# Patient Record
Sex: MALE | Race: WHITE | NOT HISPANIC OR LATINO | Employment: UNEMPLOYED | URBAN - METROPOLITAN AREA
[De-identification: names, ages, dates, MRNs, and addresses within clinical notes are randomized per-mention and may not be internally consistent; named-entity substitution may affect disease eponyms.]

---

## 2018-05-02 ENCOUNTER — OFFICE VISIT CONVERTED (OUTPATIENT)
Dept: SURGERY | Facility: CLINIC | Age: 48
End: 2018-05-02
Attending: SURGERY

## 2018-08-20 ENCOUNTER — CONVERSION ENCOUNTER (OUTPATIENT)
Dept: FAMILY MEDICINE CLINIC | Facility: CLINIC | Age: 48
End: 2018-08-20

## 2018-08-20 ENCOUNTER — OFFICE VISIT CONVERTED (OUTPATIENT)
Dept: FAMILY MEDICINE CLINIC | Facility: CLINIC | Age: 48
End: 2018-08-20
Attending: NURSE PRACTITIONER

## 2019-03-12 ENCOUNTER — OFFICE VISIT CONVERTED (OUTPATIENT)
Dept: SURGERY | Facility: CLINIC | Age: 49
End: 2019-03-12
Attending: SURGERY

## 2019-03-20 ENCOUNTER — HOSPITAL ENCOUNTER (OUTPATIENT)
Dept: CT IMAGING | Facility: HOSPITAL | Age: 49
Discharge: HOME | End: 2019-03-20
Attending: SURGERY

## 2019-03-21 ENCOUNTER — OFFICE VISIT CONVERTED (OUTPATIENT)
Dept: SURGERY | Facility: CLINIC | Age: 49
End: 2019-03-21
Attending: SURGERY

## 2019-03-21 ENCOUNTER — CONVERSION ENCOUNTER (OUTPATIENT)
Dept: SURGERY | Facility: CLINIC | Age: 49
End: 2019-03-21

## 2019-04-19 ENCOUNTER — HOSPITAL ENCOUNTER (OUTPATIENT)
Dept: PERIOP | Facility: HOSPITAL | Age: 49
Setting detail: HOSPITAL OUTPATIENT SURGERY
Discharge: HOME | End: 2019-04-19
Attending: SURGERY

## 2019-04-22 ENCOUNTER — OFFICE VISIT CONVERTED (OUTPATIENT)
Dept: SURGERY | Facility: CLINIC | Age: 49
End: 2019-04-22
Attending: NURSE PRACTITIONER

## 2019-05-07 ENCOUNTER — OFFICE VISIT CONVERTED (OUTPATIENT)
Dept: SURGERY | Facility: CLINIC | Age: 49
End: 2019-05-07
Attending: SURGERY

## 2020-01-07 ENCOUNTER — OFFICE VISIT CONVERTED (OUTPATIENT)
Dept: FAMILY MEDICINE CLINIC | Facility: CLINIC | Age: 50
End: 2020-01-07
Attending: NURSE PRACTITIONER

## 2020-01-13 ENCOUNTER — HOSPITAL ENCOUNTER (OUTPATIENT)
Dept: GENERAL RADIOLOGY | Facility: HOSPITAL | Age: 50
Discharge: HOME | End: 2020-01-13
Attending: NURSE PRACTITIONER

## 2020-01-28 ENCOUNTER — OFFICE VISIT CONVERTED (OUTPATIENT)
Dept: NEUROSURGERY | Facility: CLINIC | Age: 50
End: 2020-01-28
Attending: PHYSICIAN ASSISTANT

## 2020-02-20 ENCOUNTER — HOSPITAL ENCOUNTER (OUTPATIENT)
Dept: PHYSICAL THERAPY | Facility: CLINIC | Age: 50
Setting detail: RECURRING SERIES
Discharge: HOME | End: 2020-06-24
Attending: NEUROLOGICAL SURGERY

## 2020-02-25 ENCOUNTER — OFFICE VISIT CONVERTED (OUTPATIENT)
Dept: FAMILY MEDICINE CLINIC | Facility: CLINIC | Age: 50
End: 2020-02-25
Attending: NURSE PRACTITIONER

## 2020-03-27 ENCOUNTER — OFFICE VISIT CONVERTED (OUTPATIENT)
Dept: FAMILY MEDICINE CLINIC | Facility: CLINIC | Age: 50
End: 2020-03-27
Attending: NURSE PRACTITIONER

## 2020-04-01 ENCOUNTER — TELEMEDICINE CONVERTED (OUTPATIENT)
Dept: NEUROSURGERY | Facility: CLINIC | Age: 50
End: 2020-04-01
Attending: PHYSICIAN ASSISTANT

## 2020-05-05 ENCOUNTER — TELEPHONE CONVERTED (OUTPATIENT)
Dept: NEUROSURGERY | Facility: CLINIC | Age: 50
End: 2020-05-05
Attending: NEUROLOGICAL SURGERY

## 2020-05-27 ENCOUNTER — HOSPITAL ENCOUNTER (OUTPATIENT)
Dept: LAB | Facility: HOSPITAL | Age: 50
Discharge: HOME | End: 2020-05-27
Attending: NURSE PRACTITIONER

## 2020-05-27 LAB
ALBUMIN SERPL-MCNC: 4.4 G/DL (ref 3.5–5)
ALBUMIN/GLOB SERPL: 1.5 {RATIO} (ref 1.4–2.6)
ALP SERPL-CCNC: 55 U/L (ref 53–128)
ALT SERPL-CCNC: 11 U/L (ref 10–40)
ANION GAP SERPL CALC-SCNC: 14 MMOL/L (ref 8–19)
AST SERPL-CCNC: 15 U/L (ref 15–50)
BASOPHILS # BLD AUTO: 0.03 10*3/UL (ref 0–0.2)
BASOPHILS NFR BLD AUTO: 0.5 % (ref 0–3)
BILIRUB SERPL-MCNC: 0.46 MG/DL (ref 0.2–1.3)
BUN SERPL-MCNC: 15 MG/DL (ref 5–25)
BUN/CREAT SERPL: 13 {RATIO} (ref 6–20)
CALCIUM SERPL-MCNC: 9.1 MG/DL (ref 8.7–10.4)
CHLORIDE SERPL-SCNC: 101 MMOL/L (ref 99–111)
CHOLEST SERPL-MCNC: 167 MG/DL (ref 107–200)
CHOLEST/HDLC SERPL: 4.9 {RATIO} (ref 3–6)
CONV ABS IMM GRAN: 0.02 10*3/UL (ref 0–0.2)
CONV CO2: 26 MMOL/L (ref 22–32)
CONV IMMATURE GRAN: 0.3 % (ref 0–1.8)
CONV TOTAL PROTEIN: 7.4 G/DL (ref 6.3–8.2)
CREAT UR-MCNC: 1.14 MG/DL (ref 0.7–1.2)
DEPRECATED RDW RBC AUTO: 42.5 FL (ref 35.1–43.9)
EOSINOPHIL # BLD AUTO: 0.05 10*3/UL (ref 0–0.7)
EOSINOPHIL # BLD AUTO: 0.8 % (ref 0–7)
ERYTHROCYTE [DISTWIDTH] IN BLOOD BY AUTOMATED COUNT: 12.1 % (ref 11.6–14.4)
GFR SERPLBLD BASED ON 1.73 SQ M-ARVRAT: >60 ML/MIN/{1.73_M2}
GLOBULIN UR ELPH-MCNC: 3 G/DL (ref 2–3.5)
GLUCOSE SERPL-MCNC: 93 MG/DL (ref 70–99)
HCT VFR BLD AUTO: 48.2 % (ref 42–52)
HDLC SERPL-MCNC: 34 MG/DL (ref 40–60)
HGB BLD-MCNC: 16.2 G/DL (ref 14–18)
LDLC SERPL CALC-MCNC: 101 MG/DL (ref 70–100)
LYMPHOCYTES # BLD AUTO: 1.85 10*3/UL (ref 1–5)
LYMPHOCYTES NFR BLD AUTO: 27.9 % (ref 20–45)
MCH RBC QN AUTO: 32.1 PG (ref 27–31)
MCHC RBC AUTO-ENTMCNC: 33.6 G/DL (ref 33–37)
MCV RBC AUTO: 95.4 FL (ref 80–96)
MONOCYTES # BLD AUTO: 0.45 10*3/UL (ref 0.2–1.2)
MONOCYTES NFR BLD AUTO: 6.8 % (ref 3–10)
NEUTROPHILS # BLD AUTO: 4.22 10*3/UL (ref 2–8)
NEUTROPHILS NFR BLD AUTO: 63.7 % (ref 30–85)
NRBC CBCN: 0 % (ref 0–0.7)
OSMOLALITY SERPL CALC.SUM OF ELEC: 285 MOSM/KG (ref 273–304)
PLATELET # BLD AUTO: 170 10*3/UL (ref 130–400)
PMV BLD AUTO: 11.8 FL (ref 9.4–12.4)
POTASSIUM SERPL-SCNC: 4 MMOL/L (ref 3.5–5.3)
RBC # BLD AUTO: 5.05 10*6/UL (ref 4.7–6.1)
SODIUM SERPL-SCNC: 137 MMOL/L (ref 135–147)
T4 FREE SERPL-MCNC: 1.4 NG/DL (ref 0.9–1.8)
TRIGL SERPL-MCNC: 160 MG/DL (ref 40–150)
TSH SERPL-ACNC: 1.12 M[IU]/L (ref 0.27–4.2)
VLDLC SERPL-MCNC: 32 MG/DL (ref 5–37)
WBC # BLD AUTO: 6.62 10*3/UL (ref 4.8–10.8)

## 2020-06-10 LAB — SARS-COV-2 RNA SPEC QL NAA+PROBE: NOT DETECTED

## 2020-06-11 ENCOUNTER — HOSPITAL ENCOUNTER (OUTPATIENT)
Dept: PERIOP | Facility: HOSPITAL | Age: 50
Setting detail: HOSPITAL OUTPATIENT SURGERY
Discharge: HOME | End: 2020-06-11
Attending: NEUROLOGICAL SURGERY

## 2020-07-07 ENCOUNTER — OFFICE VISIT CONVERTED (OUTPATIENT)
Dept: NEUROSURGERY | Facility: CLINIC | Age: 50
End: 2020-07-07
Attending: PHYSICIAN ASSISTANT

## 2020-07-14 ENCOUNTER — HOSPITAL ENCOUNTER (OUTPATIENT)
Dept: PREADMISSION TESTING | Facility: HOSPITAL | Age: 50
Discharge: HOME | End: 2020-07-14
Attending: SURGERY

## 2020-07-14 ENCOUNTER — OFFICE VISIT CONVERTED (OUTPATIENT)
Dept: SURGERY | Facility: CLINIC | Age: 50
End: 2020-07-14
Attending: NURSE PRACTITIONER

## 2020-07-15 LAB — SARS-COV-2 RNA SPEC QL NAA+PROBE: NOT DETECTED

## 2020-07-17 ENCOUNTER — HOSPITAL ENCOUNTER (OUTPATIENT)
Dept: GASTROENTEROLOGY | Facility: HOSPITAL | Age: 50
Setting detail: HOSPITAL OUTPATIENT SURGERY
Discharge: HOME | End: 2020-07-17
Attending: SURGERY

## 2020-08-12 ENCOUNTER — HOSPITAL ENCOUNTER (OUTPATIENT)
Dept: MRI IMAGING | Facility: HOSPITAL | Age: 50
Discharge: HOME | End: 2020-08-12
Attending: PHYSICIAN ASSISTANT

## 2020-10-05 ENCOUNTER — HOSPITAL ENCOUNTER (OUTPATIENT)
Dept: URGENT CARE | Facility: CLINIC | Age: 50
Discharge: HOME | End: 2020-10-05
Attending: NURSE PRACTITIONER

## 2020-10-05 ENCOUNTER — CONVERSION ENCOUNTER (OUTPATIENT)
Dept: FAMILY MEDICINE CLINIC | Facility: CLINIC | Age: 50
End: 2020-10-05

## 2020-10-05 ENCOUNTER — OFFICE VISIT CONVERTED (OUTPATIENT)
Dept: FAMILY MEDICINE CLINIC | Facility: CLINIC | Age: 50
End: 2020-10-05
Attending: NURSE PRACTITIONER

## 2020-10-07 LAB — SARS-COV-2 RNA SPEC QL NAA+PROBE: NOT DETECTED

## 2020-12-01 ENCOUNTER — OFFICE VISIT CONVERTED (OUTPATIENT)
Dept: FAMILY MEDICINE CLINIC | Facility: CLINIC | Age: 50
End: 2020-12-01
Attending: NURSE PRACTITIONER

## 2021-05-10 NOTE — H&P
History and Physical      Patient Name: Indra Herbert   Patient ID: [id number]   Sex: Male   YOB: 1970    Primary Care Provider: Omar MAE   Referring Provider: Omar MAE    Visit Date: July 14, 2020    Provider: CARMELITA Walker   Location[facility]   Location Address: [address] [place] [address]  [id number]   Location Phone: [phone]          Chief Complaint  · Rectal Bleeding  · Abdominal Pain  · Epigastric Pain  · Requesting EGD and Colonoscopy      History Of Present Illness  The patient is a 49 year old /White male presenting to the [facility] on a referral from Omar MAE.   Indra Herbert needs to have a diagnostic colonoscopy and EGD.   Patient states that they have had a colonoscopy. 2 years ago   Patient currently complains of: abdominal pain and epigastric pain; BRBPR   Patient Does not have family history of colon cancer.      Patient presents today on referral from Joe Ralph for epigastric pain, left lower quadrant pain and bright red blood per rectum.  Patient reports that he is seeing bright red blood after having bowel movements.  Patient had a 3 column hemorrhoidectomy in April 2019.  Patient reports that he has been with left lower abdominal pain and epigastric pain for at least a year.  Denies taking any OTC for GERD symptoms.  Denies any heartburn or indigestion.  Denies any change in bowel habit.    Patient reports that he is taking Percocet for pain.  I have discussed with the patient about taking stool softeners on a daily basis to prevent rectal bleeding.    4/2019 - 3 column hemorrhoidectomy (Jai): Hemorrhoids.     1/2018 - Colonoscopy (Jai): Cecum - tubular adenoma; Sigmoid - tubular adenoma; hemorrhoids.       Past Medical History  Disease Name Date Onset Notes   Arthritis --  --    Asthma --  --    Cancer --  --    Degenerative Disc Disease  --  --    Epilepsy --  --    Herniated Disc --  --     Hypertension --  --    Hypothyroidism --  --    Leg pain --  --    Muscle cramps --  --    Seizures --  --    Thyroid disease --  --          Past Surgical History  Procedure Name Date Notes   Colonoscopy 2019 --    Hemorrhoidectomy --  --    Other 6-11-20 Removal of intramuscular foreign body adjacent to T10 spinous process   Total thyroidectomy --  --          Medication List  Name Date Started Instructions   famotidine 20 mg oral tablet 07/14/2020 take 1 tablet (20 mg) by oral route every 12 hours for 30 days   gabapentin 800 mg oral tablet  take 1 tablet by oral route 4 times a day   levothyroxine 112 mcg oral tablet 06/26/2020 take 1 tablet (112 mcg) by oral route once daily for 30 days   Lexapro 10 mg oral tablet 04/27/2020 take 1 tablet (10 mg) by oral route once daily   oxycodone-acetaminophen 7.5-325 mg oral tablet  take 1 tablet by oral route 3 times a day   promethazine-DM 6.25-15 mg/5 mL oral syrup 04/07/2020 take 5 milliliters by oral route every 4 hours   Skelaxin 800 mg oral tablet  take 1 tablet (800 mg) by oral route 3 times per day as needed         Allergy List  Allergen Name Date Reaction Notes   Latex --  --  --    PENICILLINS --  --  --    STEROIDAL NEUROMUSCULAR BLOCKERS --  --  --        Allergies Reconciled  Family Medical History  Disease Name Relative/Age Notes   Family history of Arthritis Brother/  Mother/   Mother; Brother   Family history of cancer Brother/   Brother   Family history of osteoporosis Mother/   Mother         Social History  Finding Status Start/Stop Quantity Notes   Alcohol Never --/-- --  does not drink   lives alone --  --/-- --  --     --  --/-- --  --    Tobacco Current every day --/-- 1 PPD current every day smoker, 1 packs per day, smoked 5 years   Unemployed --  --/-- --  --          Review of Systems  · Constitutional  o Denies  o : fever, chills  · Eyes  o Denies  o : yellowish discoloration of eyes  · HENT  o Denies  o : difficulty  "swallowing  · Cardiovascular  o Denies  o : chest pain, chest pain on exertion  · Respiratory  o Denies  o : shortness of breath  · Gastrointestinal  o Admits  o : abdominal pain, blood in stools  o Denies  o : nausea, vomiting, diarrhea, constipation  · Genitourinary  o Denies  o : abnormal color of urine  · Integument  o Denies  o : rash  · Neurologic  o Denies  o : tingling or numbness  · Musculoskeletal  o Denies  o : joint pain  · Endocrine  o Denies  o : weight gain, weight loss      Vitals  Date Time BP Position Site L\R Cuff Size HR RR TEMP (F) WT  HT  BMI kg/m2 BSA m2 O2 Sat        07/14/2020 09:52 AM       16  180lbs 0oz 5'  10\" 25.83 2.01           Physical Examination  · Constitutional  o Appearance  o : well developed, well-nourished, patient in no apparent distress  · Head and Face  o Head  o :   § Inspection  § : atraumatic, normocephalic  o Face  o :   § Inspection  § : no facial lesions  · Eyes  o Conjunctivae  o : conjunctivae normal  o Sclerae  o : sclerae white  · Neck  o Inspection/Palpation  o : normal appearance, no masses or tenderness, trachea midline  · Respiratory  o Respiratory Effort  o : breathing unlabored  · Skin and Subcutaneous Tissue  o General Inspection  o : no lesions present, no areas of discoloration, skin turgor normal, texture normal  · Neurologic  o Mental Status Examination  o :   § Orientation  § : grossly oriented to person, place and time  § Attention  § : attention normal, concentration abilities normal  § Fund of Knowledge  § : fund of knowledge within normal limits, patient aware of current events  o Gait and Station  o : normal gait, able to stand without difficulty  · Psychiatric  o Judgement and Insight  o : judgment and insight intact  o Mood and Affect  o : mood normal, affect appropriate          Assessment  · Abdominal Pain, LLQ     789.04/R10.32  · Abdominal Pain, Epigastric     789.06/R10.13  · Rectal Bleeding     569.3/K62.5  · Pre-op " testing     V72.84/Z01.818      Plan  · Orders  o Consent for Colonoscopy with Possible Biopsy - Possible risks/complications, benefits, and alternatives to surgical or invasive procedure have been explained to patient and/or legal guardian. -Patient has been evaluated and can tolerate anesthesia and/or sedation. Risks, benefits, and alternatives to anesthesia and sedation have been explained to patient and/or legal guardian. (36825) - 789.06/R10.13, 789.04/R10.32, 569.3/K62.5 - 07/14/2020  o Consent for Esophagogastroduodenoscopy (EGD) with dilation - Possible risks/complications, benefits, and alternatives to surgical or invasive procedure have been explained to patient and/or legal guardian. - Patient has been evaluated and can tolerate anesthesia and/or sedation. Risks, benefits, and alternatives to anesthesia and sedation have been explained to patient and/or legal guardian. (31117) - 789.06/R10.13, 789.04/R10.32, 569.3/K62.5 - 07/17/2020  o [facility] Pre-Op Covid-19 Screening (08493) - V72.84/Z01.818 - 07/14/2020  · Medications  o Medications have been Reconciled  o Transition of Care or Provider Policy  · Instructions  o Surgical Facility: [facility]  o Handouts Provided Pre-Procedure Instructions including date, time, and location of procedure.   o PLAN: Proceeed with colonoscopy. Patient understands risks/benefits and is willing to proceed.   o PLAN: Proceeed with EGD. Patient understands risks/benefits and is willing to proceed.   o ***Surgical Orders***  o RISK AND BENEFITS:  o Given these options, the patient has verbally expressed an understanding of the risks of the surgery and finds these risks acceptable. Will proceed with surgery as soon as possible.  o O.R. PREP: Per protocol   o IV: Per Anesthesia  o Please sign permit for: Colonoscopy with possible biopsies by Dr. Edwards.  o Please sign permit for: Esophagogastroduodenoscopy with possible biopsies and dilation by Dr. Edwards.  o The above  History and Physical Examination has been completed within 30 days of admission.  o ***Patient Status***  o Outpatient  o Follow up in the in the office post procedure.  o Advised patient he would need COVID-19 testing prior to procedures. Educated patient he needs self isolate in between testing and procedure. Patient verbalized understanding was willing to proceed.  o start Pepcid twice daily.  o Electronically Identified Patient Education Materials Provided Electronically  · Disposition  o Call or Return if symptoms worsen or persist.            Electronically Signed by: CARMELITA Walker -Author on July 14, 2020 12:29:11 PM

## 2021-05-12 NOTE — PROGRESS NOTES
Progress Note      Patient Name: Indra Herbert   Patient ID: [id number]   Sex: Male   YOB: 1970    Primary Care Provider: Omar MAE   Referring Provider: Omar MAE    Visit Date: March 27, 2020    Provider: CARMELITA Garcia   Location: [facility]   Location Address: [address] [place] [address], [address]  [id number]   Location Phone: [phone]          Chief Complaint     The patient is here for a f/u of depression and has had a cough for 9 days.       History Of Present Illness  Indra Herbert is a 49 year old /White male who presents for evaluation and treatment of:      Patient presents to the office today for a follow-up regarding his antidepressant.  Patient states that he was unable to take the Celexa as it was causing nausea.  Did discuss starting a different SSRI.  I explained that we would start at a very low dose and titrate up to prevent any adverse side effects.    Patient states that he has had a cough for approximately 9 days.  He denies any fevers at this time.  He denies any nasal congestion sinus pressure or earaches.  He denies any headaches.  He denies any nausea vomiting or diarrhea.  Patient denies any shortness of breath       Past Medical History  Disease Name Date Onset Notes   Arthritis --  --    Asthma --  --    Cancer --  --    Degenerative Disc Disease  --  --    Epilepsy --  --    Herniated Disc --  --    Hypertension --  --    Hypothyroidism --  --    Leg pain --  --    Muscle cramps --  --    Seizures --  --    Thyroid disease --  --          Past Surgical History  Procedure Name Date Notes   Colonoscopy 2019 --    Hemorrhoidectomy --  --    Total thyroidectomy --  --          Medication List  Name Date Started Instructions   amitriptyline 50 mg oral tablet 01/08/2020 take 1 tablet (50 mg) by oral route 2 times per day   Celexa 20 mg oral tablet 03/04/2020 take 1 tablet (20 mg) by oral route once daily for 30 days   Florastor  250 mg oral capsule 03/21/2019 take 2 capsules by oral route 2 times a day for 30 days   gabapentin 800 mg oral tablet  take 1 tablet by oral route 4 times a day   hydrocodone-acetaminophen  mg oral tablet  take 1 tablet by oral route every 6 hours as needed for pain   Skelaxin 800 mg oral tablet  take 1 tablet (800 mg) by oral route 3 times per day as needed   Synthroid 112 mcg oral tablet 12/20/2019 take 1 tablet (112 mcg) by oral route once daily/DAW1         Allergy List  Allergen Name Date Reaction Notes   Latex --  --  --    PENICILLINS --  --  --    STEROIDAL NEUROMUSCULAR BLOCKERS --  --  --          Family Medical History  Disease Name Relative/Age Notes   Family history of Arthritis Brother/  Mother/   Mother; Brother   Family history of cancer Brother/   Brother   Family history of osteoporosis Mother/   Mother         Social History  Finding Status Start/Stop Quantity Notes   Alcohol Never --/-- --  does not drink   lives alone --  --/-- --  --     --  --/-- --  --    Tobacco Current every day --/-- 1 PPD current every day smoker, 1 packs per day, smoked 5 years   Unemployed --  --/-- --  --          Immunizations  NameDate Admin Mfg Trade Name Lot Number Route Inj VIS Given VIS Publication   InfluenzaRefused 03/27/2020 NE Not Entered  NE NE     Comments:    Tdap01/13/2017 SKB BOOSTRIX [id number] IM LD 01/13/2017 05/01/2007   Comments: pt tolerated imj well.         Review of Systems  · Constitutional  o Denies  o : fever, fatigue, weight loss, weight gain  · Cardiovascular  o Denies  o : lower extremity edema, claudication, chest pressure, palpitations  · Respiratory  o Admits  o : cough  o Denies  o : shortness of breath, wheezing, hemoptysis, dyspnea on exertion  · Gastrointestinal  o Denies  o : nausea, vomiting, diarrhea, constipation, abdominal pain  · Psychiatric  o Admits  o : depression      Vitals  Date Time BP Position Site L\R Cuff Size HR RR TEMP (F) WT  HT  BMI kg/m2 BSA m2 O2 Sat  "       03/27/2020 02:20 /102 Sitting    99 - R 16 98.6  5'  10\"   97 %          Physical Examination  · Constitutional  o Appearance  o : well-nourished, in no acute distress  · Eyes  o Conjunctivae  o : conjunctivae normal  o Sclerae  o : sclerae white  o Pupils and Irises  o : pupils equal and round  o Eyelids/Ocular Adnexae  o : eyelid appearance normal, no exudates present  · Ears, Nose, Mouth and Throat  o Ears  o :   § External Ears  § : external auditory canal appearance within normal limits, no discharge present  § Otoscopic Examination  § : tympanic membrane appearance within normal limits bilaterally, cerumen not present  o Nose  o :   § External Nose  § : appearance normal  § Intranasal Exam  § : mucosa within normal limits, vestibules normal, no intranasal lesions present, septum midline, sinuses non tender to palpation  § Nasopharynx  § : no discharge present  o Oral Cavity  o :   § Oral Mucosa  § : oral mucosa normal  § Lips  § : lip appearance normal  § Teeth  § : normal dentation for age  o Throat  o :   § Oropharynx  § : no inflammation or lesions present, tonsils within normal limits  · Neck  o Inspection/Palpation  o : normal appearance, no masses or tenderness, trachea midline  o Range of Motion  o : cervical range of motion within normal limits  o Thyroid  o : gland size normal, nontender, no nodules or masses present on palpation  · Respiratory  o Respiratory Effort  o : breathing unlabored  o Inspection of Chest  o : normal appearance  o Auscultation of Lungs  o : normal breath sounds throughout inspiration and expiration  · Cardiovascular  o Heart  o :   § Auscultation of Heart  § : regular rate and rhythm, no murmurs, gallops or rubs  o Peripheral Vascular System  o :   § Extremities  § : no clubbing or edema  · Skin and Subcutaneous Tissue  o General Inspection  o : no rashes or lesions present, no areas of discoloration  o Body Hair  o : hair normal for age, general body hair " distribution normal for age  o Digits and Nails  o : no clubbing, cyanosis, deformities or edema present, normal appearing nails  · Neurologic  o Mental Status Examination  o :   § Orientation  § : grossly oriented to person, place and time  o Gait and Station  o : normal gait, able to stand without difficulty  · Psychiatric  o Judgement and Insight  o : judgment and insight intact  o Mood and Affect  o : mood normal, affect appropriate          Assessment  · Bronchitis, acute     466.0/J20.9  · Hypothyroidism     244.9/E03.9  · Lumbago     724.2/M54.5  · Major depressive disorder     296.20/F32.2      Plan  · Orders  o ACO-17: Screened for tobacco use AND received tobacco cessation intervention (4004F) - - 03/27/2020  o ACO-39: Current medications updated and reviewed () - - 03/27/2020  o ACO-14: Influenza immunization was not administered for reasons documented () - - 03/27/2020  · Medications  o Zithromax Z-Chao 250 mg oral tablet   SIG: take 2 tablets (500 mg) by oral route once daily for 1 day then 1 tablet (250 mg) by oral route once daily for 4 days   DISP: (6) tablets with 0 refills  Prescribed on 03/27/2020     o Lexapro 10 mg oral tablet   SIG: take 1 tablet (10 mg) by oral route once daily   DISP: (30) tablets with 0 refills  Prescribed on 03/27/2020     o amitriptyline 50 mg oral tablet   SIG: take 1 tablet (50 mg) by oral route 2 times per day   DISP: (60) tablets with 2 refills  Refilled on 03/27/2020     o Synthroid 112 mcg oral tablet   SIG: take 1 tablet (112 mcg) by oral route once daily/DAW1   DISP: (30) Tablet with 2 refills  Refilled on 03/27/2020     o Celexa 20 mg oral tablet   SIG: take 1 tablet (20 mg) by oral route once daily for 30 days   DISP: (30) tablets with 2 refills  Discontinued on 03/27/2020     o Florastor 250 mg oral capsule   SIG: take 2 capsules by oral route 2 times a day for 30 days   DISP: (120) capsules with 5 refills  Discontinued on 03/27/2020     o Medications  "have been Reconciled  o Transition of Care or Provider Policy  · Instructions  o Patient agrees to a \"No Self Harm\" contract. Patient will either call us, 911, ER, [facility], [facility].  o Patient was given an SSRI/SSNRI medication and warned of possible side effects of the medication including potential for increase risk of suicidal thoughts and feelings. Patient was instructed that if they begin to exhibit any of these effects they will discontinue the medication immediately and contact our office or the ER ASAP.   o Rest. Increase Fluids.  o Patient was educated/instructed on their diagnosis, treatment and medications prior to discharge from the clinic today.  o Time spent with the patient was minutes, more than 50% face to face.  o Electronically Identified Patient Education Materials Provided Electronically  · Disposition  o Call or Return if symptoms worsen or persist.  o Follow up in 3 months            Electronically Signed by: CARMELITA Garcia -Author on March 27, 2020 02:43:59 PM  "

## 2021-05-12 NOTE — PROGRESS NOTES
Quick Note      Patient Name: Indra Herbert   Patient ID: [id number]   Sex: Male   YOB: 1970    Primary Care Provider: Omar MAE   Referring Provider: Omar MAE    Visit Date: April 1, 2020    Provider: Paris Sevilla PA-C   Location: [facility]   Location Address: [address] [place] [address]  [id number]   Location Phone: [phone]          History Of Present Illness  TELEHEALTH VISIT  Chief Complaint: low back and left leg pain and had PT.   Indra Herbert is a 49 year old /White male who is presenting for evaluation via telehealth visit. Verbal consent obtained before beginning visit.   Provider spent 9 minutes with the patient during telehealth visit.   The following staff were present during this visit: Vianca Garsia MA and Paris Sevilla PA-C   Past Medical History/Overview of Patient Symptoms      Vitals     None taken telephone call only       Physical Examination     not performed since telephone visit only           Assessment  · Foreign body (FB) in soft tissue     729.6/M79.5  adjacent to T10 spinous process  · Low back pain     724.2/M54.5  · Lumbar radiculopathy     724.4/M54.16  · Facet arthropathy, lumbar     721.3/M47.816      Plan  · Medications  o Medications have been Reconciled  o Transition of Care or Provider Policy  · Instructions  o Plan Of Care:   o I will discuss RFA with Dr. Fuller since cannot have the facet corticosteroid injection due to allergic reaction with corticosteroids (tongue swelling and SOA). I will contact him with this information. Dr. Fuller stated he can have local anesthetic for the MBB and avoid the corticosteroid component. He has a BB adjacent to the T10 spinous process and MRI tech won't perform MRI so CT scan was done few months ago. Patient insist that he wants BB removed. He would like to f/u with Dr. Srinivasan since he operated on his brother. Then he would be able to have MRI to see why  "having left leg pain. He could consider a lumbar CT myelogram but wants BB removed then MRI instead.   · Associate Tasks  o Task ID [id number] \"''Provider to Nurse: call with Dr. Fuller's recommendations for RFA            Electronically Signed by: Paris Sevilla PA-C -Author on April 1, 2020 03:07:10 PM  "

## 2021-05-13 NOTE — PROGRESS NOTES
Progress Note      Patient Name: Indra Herbert   Patient ID: [id number]   Sex: Male   YOB: 1970    Primary Care Provider: Omar MAE   Referring Provider: Omar MAE    Visit Date: October 5, 2020    Provider: CARMELITA Nichols   Location: [facility]   Location Address: [address] [place] [address], [address] [place] [address]  [id number]   Location Phone: [phone]          Chief Complaint  · wife informed on Saturday that she was exposed to Covid. Had rapid screen done. Had to swab himself.  · states that he has a sore throat (has been hurting since EGD, was told it is from acid reflux), cough and fatigue      History Of Present Illness  Indra Herbert is a 50 year old /White male who presents for evaluation and treatment of: pt wife had tested positive. pt started having cough, sore throat but he did just have a scope, no fever and is tired feeling       Past Medical History  Disease Name Date Onset Notes   Arthritis --  --    Asthma --  --    Cancer --  --    Degenerative Disc Disease  --  --    Epilepsy --  --    Herniated Disc --  --    Hypertension --  --    Hypothyroidism --  --    Leg pain --  --    Muscle cramps --  --    Seizures --  --    Thyroid disease --  --          Past Surgical History  Procedure Name Date Notes   Colonoscopy 2019 --    Hemorrhoidectomy --  --    Other 6-11-20 Removal of intramuscular foreign body adjacent to T10 spinous process   Total thyroidectomy --  --          Medication List  Name Date Started Instructions   famotidine 20 mg oral tablet 07/14/2020 take 1 tablet (20 mg) by oral route every 12 hours for 30 days   gabapentin 800 mg oral tablet  take 1 tablet by oral route 4 times a day   levothyroxine 112 mcg oral tablet 06/26/2020 take 1 tablet (112 mcg) by oral route once daily for 30 days   Lexapro 10 mg oral tablet 04/27/2020 take 1 tablet (10 mg) by oral route once daily   oxycodone-acetaminophen 7.5-325 mg oral  tablet  take 1 tablet by oral route 3 times a day   Skelaxin 800 mg oral tablet  take 1 tablet (800 mg) by oral route 3 times per day as needed         Allergy List  Allergen Name Date Reaction Notes   Latex --  --  --    PENICILLINS --  --  --    STEROIDAL NEUROMUSCULAR BLOCKERS --  --  --        Allergies Reconciled  Family Medical History  Disease Name Relative/Age Notes   Family history of Arthritis Brother/  Mother/   Mother; Brother   Family history of cancer Brother/   Brother   Family history of osteoporosis Mother/   Mother         Social History  Finding Status Start/Stop Quantity Notes   Alcohol Never --/-- --  does not drink   lives alone --  --/-- --  --     --  --/-- --  --    Tobacco Current every day --/-- 1 PPD current every day smoker, 1 packs per day, smoked 5 years   Unemployed --  --/-- --  --          Immunizations  NameDate Admin Mfg Trade Name Lot Number Route Inj VIS Given VIS Publication   InfluenzaRefused 03/27/2020 NE Not Entered  NE NE     Comments:    Tdap01/13/2017 SKB BOOSTRIX 4sn42 IM LD 01/13/2017 05/01/2007   Comments: pt tolerated imj well.         Review of Systems  · Constitutional  o Admits  o : fatigue  o Denies  o : night sweats  · Eyes  o Denies  o : double vision, blurred vision  · HENT  o Admits  o : sore throat but could also be due to scope. before this he did have a scratchy throat but also has reflux  o Denies  o : vertigo, recent head injury  · Breasts  o Denies  o : abnormal changes in breast size, additional breast symptoms except as noted in the HPI  · Cardiovascular  o Denies  o : chest pain, irregular heart beats  · Respiratory  o Admits  o : smokes pack/day cigarettes  o Denies  o : shortness of breath, productive cough  · Gastrointestinal  o Admits  o : stressed keeping head of bed elevated at least 20 degrees  o Denies  o : nausea, vomiting, diarrhea  · Genitourinary  o Denies  o : dysuria, urinary retention  · Integument  o Denies  o : hair growth  change, new skin lesions  · Neurologic  o Denies  o : altered mental status, seizures  · Musculoskeletal  o Denies  o : joint swelling, limitation of motion  · Endocrine  o Denies  o : cold intolerance, heat intolerance  · Heme-Lymph  o Denies  o : petechiae, lymph node enlargement or tenderness  · Allergic-Immunologic  o Denies  o : frequent illnesses      Vitals  Date Time BP Position Site L\R Cuff Size HR RR TEMP (F) WT  HT  BMI kg/m2 BSA m2 O2 Sat FR L/min FiO2 HC       10/05/2020 10:07 AM        95.5     98 %            Physical Examination  · Constitutional  o Appearance  o : well-nourished, well developed, alert, in no acute distress  · Eyes  o Conjunctivae  o : conjunctivae normal  o Sclerae  o : sclerae white  o Pupils and Irises  o : pupils equal, round, and reactive to light and accommodation bilaterally  o Corneas  o : tear film normal, no lesions present  o Eyelids/Ocular Adnexae  o : eyelid appearance normal, no exudates present, eye moisture level normal  · Ears, Nose, Mouth and Throat  o Ears  o : external ear auricle normal, otic canal normal, TM with no reddness, effusion, retraction  o Nose  o : external normal, nasal mucosa normal, turbinates normal  o Oral Cavity  o : tongue no lesion, oral mucosa normal  o Throat  o : no erythemia, exudate or lesions  · Neck  o Inspection/Palpation  o : normal appearance, no masses or tenderness, trachea midline, no enlarged cervical or supraclavicular lymphnodes palpated  o Thyroid  o : gland size normal, nontender, no nodules or masses present on palpation, thyroid motion normal during swallowing  · Respiratory  o Respiratory Effort  o : breathing unlabored  o Inspection of Chest  o : normal appearance, no retractions  o Auscultation of Lungs  o : normal breath sounds throughout  · Cardiovascular  o Heart  o :   § Auscultation of Heart  § : regular rate and rhythm without murmur  · Skin and Subcutaneous Tissue  o General Inspection  o : no rashes or lesions  present, no areas of discoloration  · Neurologic  o Mental Status Examination  o : judgement, insight intact, modd and affect appropriate  o Motor Examination  o : strength grossly intact in all four extremities  o Gait and Station  o : normal gait, able to stand without difficulty          Assessment  · Cough     786.2/R05  · Exposure to COVID-19 virus     V01.79/Z20.828  · Fatigue     780.79/R53.83  · Tobacco abuse counseling       Tobacco abuse counseling     V65.42/Z71.6  · Sore throat     462/J02.9      Plan  · Orders  o [facility] NCOV2 (send-out) (12223) - V01.79/Z20.828 - 10/05/2020  o ACO-17: Screened for tobacco use AND received tobacco cessation intervention (4004F) - V65.42/Z71.6 - 10/05/2020  o ACO-39: Current medications updated and reviewed (, 1159F) - - 10/05/2020  o [facility] NCOV2 (send-out) (67061) - V01.79/Z20.828 - 10/05/2020  · Medications  o promethazine-DM 6.25-15 mg/5 mL oral syrup   SIG: take 5 milliliters by oral route every 4 hours   DISP: (200) milliliters with 0 refills  Discontinued on 10/05/2020     o Medications have been Reconciled  o Transition of Care or Provider Policy  · Instructions  o Tobacco and smoking cessation counseling for more than 3 minutes was completed.  o Take all medications as prescribed/directed.  o Patient was educated/instructed on their diagnosis, treatment and medications prior to discharge from the clinic today.  o Recommend getting throat lozenges, do warm salt water gargles,Can use Robitussin-DM take every 4 hours as needed, does not cause drowsiness usually, drink plenty of fluids, also saline nasal spray use as needed  o scheduled for Covid testing at 11:15 today  o note given for work  · Disposition  o Call or Return if symptoms worsen or persist.            Electronically Signed by: Portia Guajardo APRN -Author on October 5, 2020 10:34:29 AM

## 2021-05-13 NOTE — PROGRESS NOTES
Progress Note      Patient Name: Indra Herbert   Patient ID: [id number]   Sex: Male   YOB: 1970    Primary Care Provider: Omar MAE   Referring Provider: Omar MAE    Visit Date: December 1, 2020    Provider: CARMELITA Garcia   Location: [facility]   Location Address: [address] [place] [address], [address]  [place], [place]  [id number]   Location Phone: [phone]          Chief Complaint  · Sore throat and burning in mouth since scope in August       History Of Present Illness  Indra Herbert is a 50 year old /White male who presents for evaluation and treatment of:      Patient presents to the clinic today with complaints of sore throat. He states that this started in august following his scope. He states that the pain is constant in his throat, tongue, and roof of his mouth. He states he was tested for strep and mono and they were negative. He denies any fever, nausea, vomiting, or abdominal pain. He states he went to urgent care 3 weeks ago and was given nystatin swish and swallow and he has not had any relief.       Past Medical History  Disease Name Date Onset Notes   Arthritis --  --    Asthma --  --    Cancer --  --    Degenerative Disc Disease  --  --    Epilepsy --  --    Herniated Disc --  --    Hypertension --  --    Hypothyroidism --  --    Leg pain --  --    Muscle cramps --  --    Seizures --  --    Thyroid disease --  --          Past Surgical History  Procedure Name Date Notes   Colonoscopy 2019 --    Hemorrhoidectomy --  --    Other 6-11-20 Removal of intramuscular foreign body adjacent to T10 spinous process   Total thyroidectomy --  --          Medication List  Name Date Started Instructions   famotidine 20 mg oral tablet 07/14/2020 take 1 tablet (20 mg) by oral route every 12 hours for 30 days   gabapentin 800 mg oral tablet  take 1 tablet by oral route 4 times a day   hydrocodone-acetaminophen  mg oral tablet  take 1 tablet by  oral route 3 times a day   levothyroxine 112 mcg oral tablet 06/26/2020 take 1 tablet (112 mcg) by oral route once daily for 30 days   Lexapro 10 mg oral tablet 04/27/2020 take 1 tablet (10 mg) by oral route once daily   Skelaxin 800 mg oral tablet  take 1 tablet (800 mg) by oral route 3 times per day as needed         Allergy List  Allergen Name Date Reaction Notes   Latex --  --  --    PENICILLINS --  --  --    STEROIDAL NEUROMUSCULAR BLOCKERS --  --  --          Family Medical History  Disease Name Relative/Age Notes   Family history of Arthritis Brother/  Mother/   Mother; Brother   Family history of cancer Brother/   Brother   Family history of osteoporosis Mother/   Mother         Social History  Finding Status Start/Stop Quantity Notes   Alcohol Never --/-- --  does not drink   lives alone --  --/-- --  --     --  --/-- --  --    Tobacco Current every day --/-- 1 PPD current every day smoker, 1 packs per day, smoked 5 years   Unemployed --  --/-- --  --          Immunizations  NameDate Admin Mfg Trade Name Lot Number Route Inj VIS Given VIS Publication   InfluenzaRefused 03/27/2020 NE Not Entered  NE NE     Comments:    Tdap01/13/2017 SKB BOOSTRIX 4sn42 IM LD 01/13/2017 05/01/2007   Comments: pt tolerated imj well.         Review of Systems  · Constitutional  o Denies  o : fatigue, night sweats  · Eyes  o Denies  o : double vision, blurred vision  · HENT  o Denies  o : vertigo, recent head injury  · Breasts  o Denies  o : abnormal changes in breast size, additional breast symptoms except as noted in the HPI  · Cardiovascular  o Denies  o : chest pain, irregular heart beats  · Respiratory  o Denies  o : shortness of breath, productive cough  · Gastrointestinal  o Denies  o : nausea, vomiting  · Genitourinary  o Denies  o : dysuria, urinary retention  · Integument  o Denies  o : hair growth change, new skin lesions  · Neurologic  o Denies  o : altered mental status,  "seizures  · Musculoskeletal  o Denies  o : joint swelling, limitation of motion  · Endocrine  o Denies  o : cold intolerance, heat intolerance  · Heme-Lymph  o Denies  o : petechiae, lymph node enlargement or tenderness  · Allergic-Immunologic  o Denies  o : frequent illnesses      Vitals  Date Time BP Position Site L\R Cuff Size HR RR TEMP (F) WT  HT  BMI kg/m2 BSA m2 O2 Sat FR L/min FiO2        12/01/2020 07:29 /80 Sitting    89 - R  97.1 188lbs 0oz 5'  10\" 26.97 2.05 97 %            Physical Examination  · Constitutional  o Appearance  o : well-nourished, in no acute distress  · Eyes  o Conjunctivae  o : conjunctivae normal  o Sclerae  o : sclerae white  o Pupils and Irises  o : pupils equal and round  o Eyelids/Ocular Adnexae  o : eyelid appearance normal, no exudates present  · Ears, Nose, Mouth and Throat  o Nose  o :   § External Nose  § : appearance normal  o Oral Cavity  o :   § Oral Mucosa  § : oral mucosa normal  § Lips  § : lip appearance normal  § Teeth  § : normal dentation for age  o Throat  o :   § Oropharynx  § : oropharynx inflammation present, tonsils surgically absent   · Neck  o Inspection/Palpation  o : normal appearance, no masses or tenderness, trachea midline  o Range of Motion  o : cervical range of motion within normal limits  o Thyroid  o : gland size normal, nontender, no nodules or masses present on palpation  · Respiratory  o Respiratory Effort  o : breathing unlabored  o Inspection of Chest  o : normal appearance  o Auscultation of Lungs  o : normal breath sounds throughout inspiration and expiration  · Cardiovascular  o Heart  o :   § Auscultation of Heart  § : regular rate and rhythm, no murmurs, gallops or rubs  o Peripheral Vascular System  o :   § Pedal Pulses  § : pulses 2 bilaterally  § Extremities  § : no clubbing or edema  · Lymphatic  o Neck  o : no lymphadenopathy present  · Skin and Subcutaneous Tissue  o General Inspection  o : no rashes or lesions present, no " areas of discoloration  o Body Hair  o : hair normal for age, general body hair distribution normal for age  o Digits and Nails  o : no clubbing, cyanosis, deformities or edema present, normal appearing nails  · Neurologic  o Mental Status Examination  o :   § Orientation  § : grossly oriented to person, place and time  o Gait and Station  o : normal gait, able to stand without difficulty  · Psychiatric  o Judgement and Insight  o : judgment and insight intact  o Mood and Affect  o : mood normal, affect appropriate  o Presence of Abnormal Thoughts  o : no hallucinations, no delusions present, no psychotic thoughts          Assessment  · Pharyngitis, acute     462/J02.9      Plan  · Orders  o ACO-39: Current medications updated and reviewed (, 1159F) - - 12/01/2020  · Medications  o Zithromax Z-Chao 250 mg oral tablet   SIG: take 2 tablets (500 mg) by oral route once daily for 1 day then 1 tablet (250 mg) by oral route once daily for 4 days   DISP: (6) Tablet with 0 refills  Prescribed on 12/01/2020     o Medications have been Reconciled  o Transition of Care or Provider Policy  · Instructions  o Patient was educated/instructed on their diagnosis, treatment and medications prior to discharge from the clinic today.  o Minutes spent with patient including greater than 50% in Education/Counseling/Care Coordination.  o Time spent with the patient was minutes, more than 50% face to face.  o Electronically Identified Patient Education Materials Provided Electronically  · Disposition  o Call or Return if symptoms worsen or persist.            Electronically Signed by: CARMELITA Garcia -Author on December 1, 2020 10:09:44 AM

## 2021-05-13 NOTE — PROGRESS NOTES
Quick Note      Patient Name: Indra Herbert   Patient ID: [id number]   Sex: Male   YOB: 1970    Primary Care Provider: Omar MAE   Referring Provider: Omar MAE    Visit Date: May 5, 2020    Provider: Moody Srinivasan MD   Location: [facility]   Location Address: [address] [place] [address]  [id number]   Location Phone: [phone]          History Of Present Illness  TELEHEALTH TELEPHONE VISIT  Chief Complaint: low back and left leg pain   Indra Herbert is a 49 year old /White male who is presenting for evaluation via telehealth telephone visit. Verbal consent obtained before beginning visit.   Provider spent 12 minutes with the patient during telehealth visit.   The following staff were present during this visit: Moody Srinivasan   Past Medical History/Overview of Patient Symptoms     He has a BB in the thoracic spine and would like this removed so he can get an MRI. He has lower back and left hip and leg pain. He was hardly able to get out of bed for 2 months. The BB is at T10 just adjacent to the spinous process.       Physical Examination     Telephone visit, no PE performed.           Assessment  · Low back pain     724.2/M54.5  · Foreign body (FB) in soft tissue     729.6/M79.5  T10 adjacent to the spinous process on the left  · Preoperative examination     V72.84/Z01.818      Plan  · Orders  o Physician Telephone Evaluation, 11-20 minutes (73438) - - 05/05/2020  · Medications  o Medications have been Reconciled  o Transition of Care or Provider Policy  · Instructions  o Plan Of Care:   o I have recommended he not work more than 4 days per week until the issue with his lower back and foot are evaluated and treated.   o We are planning for removal of the foreign body at T10 to allow for an MRI of the lumbar region.   o ****Surgical Orders****  o Outpatient  o RISK AND BENEFITS:  o Possible risks/complications, benefits and alternatives to surgical or  invasive procedure have been explained to the patient and/or legal guardian.  o Patient has been evaluated and can tolerate anesthesia and/or sedation. Risks, benefits, and alternatives to anesthesia and sedation have been explained to the patient and/or legal guardian.  o PREP: Per protocol;  o IV: Per Anesthesia;  o *******************************************  o PRE- OP MEDICATION ORDER:  o *******************************************  o Clindamycin 900 mg IV on call to OR.  o ***************  o Date of Surgical Procedure:   o Please sign permit for: Resection of foreign body adjacent to thoracic 10 spinous process on the left  o The above History and Physical must have been completed within 30 days of admission.  · Associate Tasks  o Task ID [id number] General Task: Please print out work note for patient to  tomorrow (only work 4 days/week as in instructions) .  o Task ID [id number] General Task: Please schedule for outpt. surgery as per his note.            Electronically Signed by: Moody Srinivasan MD -Author on May 5, 2020 02:40:00 PM

## 2021-05-13 NOTE — PROGRESS NOTES
Progress Note      Patient Name: Indra Herbert   Patient ID: [id number]   Sex: Male   YOB: 1970    Primary Care Provider: Omar MAE   Referring Provider: Omar MAE    Visit Date: July 7, 2020    Provider: Paris Sevilla PA-C   Location: [facility]   Location Address: [address] [place] [address]  [id number]   Location Phone: [phone]          Chief Complaint     3 week Post OP.       History Of Present Illness  The patient is a 49 year old /White male who is in the office for followup appointment.      He had BB removed from near the T10 spinous process so he can now have a MRI. Denies fever or drainage from the wound.  So now can proceed with proceed with MRI lumbar spine.  Still having left leg pain down to the foot.  Feels like bones in spine rub together.  Pain in back constant but worse with bending and twisting at the waist and worse in the morning.       Past Medical History  Arthritis; Asthma; Cancer; Degenerative Disc Disease ; Epilepsy; Herniated Disc; Hypertension; Hypothyroidism; Leg pain; Muscle cramps; Seizures; Thyroid disease         Past Surgical History  Colonoscopy; Hemorrhoidectomy; Other; Total thyroidectomy         Medication List  amitriptyline 50 mg oral tablet; gabapentin 800 mg oral tablet; levothyroxine 112 mcg oral tablet; Lexapro 10 mg oral tablet; oxycodone-acetaminophen 7.5-325 mg oral tablet; promethazine-DM 6.25-15 mg/5 mL oral syrup; Skelaxin 800 mg oral tablet         Allergy List  Latex; PENICILLINS; STEROIDAL NEUROMUSCULAR BLOCKERS       Allergies Reconciled  Family Medical History  Family history of Arthritis; Family history of cancer; Family history of osteoporosis         Social History  Alcohol (Never); lives alone; ; Tobacco (Current every day); Unemployed         Immunizations  Name Date Admin   Influenza Refused   Tdap          Review of Systems  · Constitutional  o Denies  o : chills, excessive  "sweating, fatigue, fever, sycope/passing out, weight gain, weight loss  · Eyes  o Denies  o : changes in vision, blurry vision, double vision  · HENT  o Denies  o : loss of hearing, ringing in the ears, ear aches, sore throat, nasal congestion, sinus pain, nose bleeds, seasonal allergies  · Cardiovascular  o Denies  o : blood clots, swollen legs, anemia, easy burising or bleeding, transfusions  · Respiratory  o Denies  o : shortness of breath, dry cough, productive cough, pneumonia, COPD  · Gastrointestinal  o Denies  o : difficulty swallowing, reflux  · Genitourinary  o Denies  o : incontinence  · Neurologic  o Denies  o : headache, seizure, stroke, tremor, loss of balance, falls, dizziness/vertigo, difficulty with sleep, numbness/tingling/paresthesia , difficulty with coordination, difficulty with dexterity, weakness  · Musculoskeletal  o Admits  o : low back pain, mid back pain and neck pain  o Denies  o : neck stiffness/pain, swollen lymph nodes, muscle aches, joint pain, weakness, spasms, sciatica, pain radiating in arm, pain radiating in leg  · Endocrine  o Denies  o : diabetes, thyroid disorder  · Psychiatric  o Denies  o : anxiety, depression  · All Others Negative      Vitals  Date Time BP Position Site L\R Cuff Size HR RR TEMP (F) WT  HT  BMI kg/m2 BSA m2 O2 Sat        07/07/2020 02:40 PM        98.1 183lbs 2oz 5'  10\" 26.28 2.03           Physical Examination  · Constitutional  o Appearance  o : well-nourished, well developed, alert, in no acute distress  · Respiratory  o Respiratory Effort  o : breathing unlabored  · Cardiovascular  o Peripheral Vascular System  o :   § Extremities  § : no cyanosis, clubbing or edema  · Neurologic  o Mental Status Examination  o :   § Orientation  § : grossly oriented to person, place and time  o Motor Examination  o :   § RLE Strength  § : strength normal  § RLE Motor Function  § : tone normal, no atrophy, no abnormal movements noted  § LLE Strength  § : strength " normal  § LLE Motor Function  § : tone normal, no atrophy, no abnormal movements noted  o Reflexes  o :   § RLE  § : 2/4 knee and ankle reflex  § LLE  § : 2/4 knee and ankle reflex, positive SLR   o Sensation  o :   § Light Touch  § : sensation intact to light touch in extremities  o Gait and Station  o :   § Gait Screening  § : gait within normal limits  · Psychiatric  o Mood and Affect  o : mood normal, affect appropriate     lower thoracic spine incision has healed and no signs of infection           Assessment  · Low back pain     724.2/M54.5  · Paresthesia     782.0/R20.2  · Lumbar radiculopathy     724.4/M54.16    Problems Reconciled  Plan  · Orders  o MRI lumbar spine wo contrast (25994) - 724.2/M54.5, 724.4/M54.16, 782.0/R20.2 - 07/07/2020   [facility] or [facility]   large bore at [facility] due to claustrophobia per patient request  · Medications  o Medications have been Reconciled  o Transition of Care or Provider Policy  · Instructions  o Encouraged to follow-up with Primary Care Provider for preventative care.  o The ROS and the PFSH were reviewed at today's visit.  o Call or return to office if symptoms worsen or persist.   o He is doing well post operatively. He will slowly increase activity. I will order MRI lumbar spine and f/u to discuss results.             Electronically Signed by: JANET CevallosC -Author on July 7, 2020 03:09:28 PM

## 2021-05-14 VITALS
BODY MASS INDEX: 26.92 KG/M2 | HEIGHT: 70 IN | HEART RATE: 89 BPM | OXYGEN SATURATION: 97 % | TEMPERATURE: 97.1 F | DIASTOLIC BLOOD PRESSURE: 80 MMHG | WEIGHT: 188 LBS | SYSTOLIC BLOOD PRESSURE: 124 MMHG

## 2021-05-14 VITALS — OXYGEN SATURATION: 98 % | TEMPERATURE: 95.5 F

## 2021-05-15 VITALS
RESPIRATION RATE: 16 BRPM | SYSTOLIC BLOOD PRESSURE: 132 MMHG | HEART RATE: 99 BPM | HEIGHT: 70 IN | DIASTOLIC BLOOD PRESSURE: 102 MMHG | TEMPERATURE: 98.6 F | OXYGEN SATURATION: 97 %

## 2021-05-15 VITALS — WEIGHT: 183.12 LBS | HEIGHT: 70 IN | TEMPERATURE: 98.1 F | BODY MASS INDEX: 26.22 KG/M2

## 2021-05-15 VITALS — BODY MASS INDEX: 27.49 KG/M2 | HEIGHT: 70 IN | RESPIRATION RATE: 14 BRPM | WEIGHT: 192 LBS

## 2021-05-15 VITALS
HEIGHT: 70 IN | RESPIRATION RATE: 16 BRPM | TEMPERATURE: 96.8 F | BODY MASS INDEX: 26.34 KG/M2 | SYSTOLIC BLOOD PRESSURE: 132 MMHG | DIASTOLIC BLOOD PRESSURE: 92 MMHG | OXYGEN SATURATION: 96 % | HEART RATE: 88 BPM | WEIGHT: 184 LBS

## 2021-05-15 VITALS
TEMPERATURE: 96.7 F | HEIGHT: 70 IN | BODY MASS INDEX: 27.06 KG/M2 | RESPIRATION RATE: 16 BRPM | DIASTOLIC BLOOD PRESSURE: 82 MMHG | HEART RATE: 76 BPM | OXYGEN SATURATION: 95 % | WEIGHT: 189 LBS | SYSTOLIC BLOOD PRESSURE: 132 MMHG

## 2021-05-15 VITALS — HEIGHT: 70 IN | RESPIRATION RATE: 16 BRPM | BODY MASS INDEX: 27.49 KG/M2 | WEIGHT: 192 LBS

## 2021-05-15 VITALS
WEIGHT: 185.44 LBS | HEIGHT: 70 IN | BODY MASS INDEX: 26.55 KG/M2 | DIASTOLIC BLOOD PRESSURE: 83 MMHG | SYSTOLIC BLOOD PRESSURE: 141 MMHG

## 2021-05-15 VITALS — BODY MASS INDEX: 27.49 KG/M2 | HEIGHT: 70 IN | WEIGHT: 192 LBS | RESPIRATION RATE: 16 BRPM

## 2021-05-15 VITALS — RESPIRATION RATE: 16 BRPM | WEIGHT: 180 LBS | HEIGHT: 70 IN | BODY MASS INDEX: 25.77 KG/M2

## 2021-05-15 VITALS — WEIGHT: 191.5 LBS | RESPIRATION RATE: 14 BRPM | HEIGHT: 70 IN | BODY MASS INDEX: 27.41 KG/M2

## 2021-05-16 VITALS
SYSTOLIC BLOOD PRESSURE: 135 MMHG | HEART RATE: 82 BPM | TEMPERATURE: 98 F | RESPIRATION RATE: 16 BRPM | OXYGEN SATURATION: 97 % | WEIGHT: 194 LBS | HEIGHT: 70 IN | DIASTOLIC BLOOD PRESSURE: 84 MMHG | BODY MASS INDEX: 27.77 KG/M2

## 2021-07-29 RX ORDER — LEVOTHYROXINE SODIUM 112 UG/1
TABLET ORAL
Qty: 30 TABLET | Refills: 2 | Status: SHIPPED | OUTPATIENT
Start: 2021-07-29 | End: 2021-11-01

## 2021-09-01 ENCOUNTER — TELEPHONE (OUTPATIENT)
Dept: FAMILY MEDICINE CLINIC | Facility: CLINIC | Age: 51
End: 2021-09-01

## 2021-09-01 NOTE — TELEPHONE ENCOUNTER
Caller: JOSEE SILVA    Relationship to patient: Child    Best call back number: [phone]    Patient is needing: PATIENT'S DAUGHTER IS VERY CONCERNED ABOUT HER DAD HAVING COVID. HE'S RECENTLY BEEN EXPOSED TO HIS SON WHO TESTED POSITIVE FOR COVID ON 08.29.2021. PATIENT IS NOW EXPERIENCING EXTREME FATIGUE AND A BAD COUGH, PATIENT'S DAUGHTER WAS UNSURE IF HE'S HAD LOSS OF TASTE/SMELL OR FEVER/CHILLS. NO SHORTNESS OF AIR HAS BEEN NOTED. PATIENT'S DAUGHTER IS VERY CONCERNED BECAUSE HE DAD IS ALSO A CANCER PATIENT.    DAUGHTER WOULD LIKE TO KNOW IF THERE'S ANYTHING SHE CAN DO TO HELP HER FATHER AT THIS TIME.

## 2021-09-02 NOTE — TELEPHONE ENCOUNTER
Informed daughter Omar would like him to be evaluated at the ER, also suggested they should call pt's oncologist.  Discussed antibody infusion and requirements for it.

## 2021-11-01 RX ORDER — LEVOTHYROXINE SODIUM 112 UG/1
TABLET ORAL
Qty: 30 TABLET | Refills: 2 | Status: SHIPPED | OUTPATIENT
Start: 2021-11-01 | End: 2022-02-07 | Stop reason: SDUPTHER

## 2021-11-08 ENCOUNTER — CLINICAL SUPPORT (OUTPATIENT)
Dept: FAMILY MEDICINE CLINIC | Facility: CLINIC | Age: 51
End: 2021-11-08

## 2021-11-08 VITALS — SYSTOLIC BLOOD PRESSURE: 136 MMHG | DIASTOLIC BLOOD PRESSURE: 80 MMHG | HEART RATE: 83 BPM

## 2021-11-10 ENCOUNTER — TELEPHONE (OUTPATIENT)
Dept: FAMILY MEDICINE CLINIC | Facility: CLINIC | Age: 51
End: 2021-11-10

## 2021-11-10 NOTE — TELEPHONE ENCOUNTER
Attempted to call pt, we received paperwork that needs to be completed, but phone is no longer in service, per danielle pt needs to be seen to complete paperwork

## 2022-02-01 RX ORDER — LEVOTHYROXINE SODIUM 112 UG/1
TABLET ORAL
Qty: 30 TABLET | Refills: 0 | OUTPATIENT
Start: 2022-02-01

## 2022-02-03 RX ORDER — LEVOTHYROXINE SODIUM 112 UG/1
112 TABLET ORAL DAILY
Qty: 30 TABLET | Refills: 2 | Status: CANCELLED | OUTPATIENT
Start: 2022-02-03

## 2022-02-03 NOTE — TELEPHONE ENCOUNTER
Caller: Indra Herbert    Relationship: Self    Best call back number:     Requested Prescriptions:   Requested Prescriptions     Pending Prescriptions Disp Refills   • levothyroxine (SYNTHROID, LEVOTHROID) 112 MCG tablet 30 tablet 2     Sig: Take 1 tablet by mouth Daily.        Pharmacy where request should be sent: [facility] - [address] - [phone]  - [phone] FX     Additional details provided by patient: PATIENT IS FULLY OUT OF MEDICATION. HE HAS AN APPT SCHEDULED FOR 2/7/22 AND WOULD LIKE A PARTIAL REFILL TO MAKE IT UNTIL HIS APPT.    Does the patient have less than a 3 day supply:  [x] Yes  [] No    Ketty Barry Rep   02/03/22 09:17 EST

## 2022-02-04 RX ORDER — LEVOTHYROXINE SODIUM 112 UG/1
TABLET ORAL
Qty: 30 TABLET | Refills: 2 | OUTPATIENT
Start: 2022-02-04

## 2022-02-07 ENCOUNTER — OFFICE VISIT (OUTPATIENT)
Dept: FAMILY MEDICINE CLINIC | Facility: CLINIC | Age: 52
End: 2022-02-07

## 2022-02-07 ENCOUNTER — TELEPHONE (OUTPATIENT)
Dept: FAMILY MEDICINE CLINIC | Facility: CLINIC | Age: 52
End: 2022-02-07

## 2022-02-07 VITALS
WEIGHT: 176.4 LBS | RESPIRATION RATE: 16 BRPM | DIASTOLIC BLOOD PRESSURE: 92 MMHG | HEART RATE: 101 BPM | BODY MASS INDEX: 25.25 KG/M2 | OXYGEN SATURATION: 98 % | TEMPERATURE: 98 F | SYSTOLIC BLOOD PRESSURE: 152 MMHG | HEIGHT: 70 IN

## 2022-02-07 DIAGNOSIS — E03.9 HYPOTHYROIDISM, UNSPECIFIED TYPE: ICD-10-CM

## 2022-02-07 DIAGNOSIS — R05.9 COUGH: ICD-10-CM

## 2022-02-07 DIAGNOSIS — H57.89 EYE SWELLING, RIGHT: Primary | ICD-10-CM

## 2022-02-07 DIAGNOSIS — I10 HYPERTENSION, UNSPECIFIED TYPE: ICD-10-CM

## 2022-02-07 DIAGNOSIS — H57.11 EYE PAIN, RIGHT: ICD-10-CM

## 2022-02-07 DIAGNOSIS — J30.9 ALLERGIC RHINITIS, UNSPECIFIED SEASONALITY, UNSPECIFIED TRIGGER: ICD-10-CM

## 2022-02-07 PROCEDURE — 99214 OFFICE O/P EST MOD 30 MIN: CPT

## 2022-02-07 RX ORDER — GUAIFENESIN AND CODEINE PHOSPHATE 100; 10 MG/5ML; MG/5ML
5 SOLUTION ORAL 3 TIMES DAILY PRN
Qty: 180 ML | Refills: 0 | Status: SHIPPED | OUTPATIENT
Start: 2022-02-07 | End: 2022-02-28

## 2022-02-07 RX ORDER — NALOXONE HYDROCHLORIDE 4 MG/.1ML
SPRAY NASAL
COMMUNITY

## 2022-02-07 RX ORDER — GABAPENTIN 800 MG/1
TABLET ORAL
COMMUNITY
Start: 2022-02-01

## 2022-02-07 RX ORDER — METAXALONE 800 MG/1
TABLET ORAL
COMMUNITY

## 2022-02-07 RX ORDER — AMITRIPTYLINE HYDROCHLORIDE 10 MG/1
10 TABLET, FILM COATED ORAL
COMMUNITY
Start: 2022-02-01

## 2022-02-07 RX ORDER — LEVOTHYROXINE SODIUM 112 UG/1
112 TABLET ORAL DAILY
Qty: 30 TABLET | Refills: 0 | Status: SHIPPED | OUTPATIENT
Start: 2022-02-07 | End: 2022-02-28 | Stop reason: SDUPTHER

## 2022-02-07 RX ORDER — CETIRIZINE HYDROCHLORIDE 10 MG/1
10 TABLET ORAL DAILY
Qty: 30 TABLET | Refills: 1 | Status: SHIPPED | OUTPATIENT
Start: 2022-02-07 | End: 2022-06-28 | Stop reason: SDUPTHER

## 2022-02-07 RX ORDER — IBUPROFEN 800 MG/1
TABLET ORAL
COMMUNITY

## 2022-02-07 RX ORDER — HYDROCODONE BITARTRATE AND ACETAMINOPHEN 10; 325 MG/1; MG/1
TABLET ORAL
COMMUNITY

## 2022-02-07 NOTE — TELEPHONE ENCOUNTER
Patient is upset that the medication was not called in. States he does not have a thyroid at all and when he does not have his medication it makes him sick and his kidneys act up. Patient has an appointment to come in at 10:45

## 2022-02-07 NOTE — PROGRESS NOTES
"Indra Herbert presents to [facility] with complaints of persistent cough after having bronchitis, right swelling/pain in his right eye, and for refill of his levothyroxine.      History of Present Illness  This is a 51-year-old male, past medical history significant for hypothyroidism, who presents to the clinic with complaints of persistent cough after having bronchitis, right swelling/pain in his right eye, and for refill of his levothyroxine.      Hypothyroidism: Patient had not been seen for a year, and was instructed to make an appointment in order to get a refill of his levothyroxine.  Patient is also not had his TSH checked in the past year, but states that it has been stable in the past.  Patient's been on the dose of 112 mcg for several years.    Cough: Patient states that he had bronchitis a few weeks ago, and his cough is still present.  He states that it is worse at night, states it keeps him awake at night, and he said he tried several things over-the-counter but nothing is helping.  Patient would like some help with this, such as another cough syrup.    Right swelling/pain in his right eye: Patient states this started approximately 1 month ago, states it is not an every day occurrence, but when it does occur it is painful and has some swelling.  Patient also notices a change in his vision during these moments, states that he is taken some things over-the-counter and does not help.    The following portions of the patient's history were personally reviewed and updated as appropriate: allergies, current medications, past medical history, past surgical history, past family history, and past social history.       Objective   Vital Signs:   /92   Pulse 101   Temp 98 °F (36.7 °C)   Resp 16   Ht 177.8 cm (70\")   Wt 80 kg (176 lb 6.4 oz)   SpO2 98%   BMI 25.31 kg/m²     Body mass index is 25.31 kg/m².    All labs, imaging, test results, and specialty " provider notes reviewed with patient.     Physical Exam  Vitals reviewed.   Constitutional:       Appearance: Normal appearance.   Eyes:      General: Lids are everted, no foreign bodies appreciated. Visual field deficit present.         Right eye: No foreign body.      Conjunctiva/sclera:      Right eye: No exudate.     Comments: Generalized swelling around right eye   Cardiovascular:      Rate and Rhythm: Normal rate and regular rhythm.      Pulses: Normal pulses.      Heart sounds: Normal heart sounds.   Pulmonary:      Effort: Pulmonary effort is normal.      Breath sounds: Normal breath sounds.   Neurological:      General: No focal deficit present.      Mental Status: He is alert and oriented to person, place, and time.          Assessment and Plan:  Diagnoses and all orders for this visit:    1. Eye swelling, right (Primary)  -     Ambulatory Referral to Ophthalmology  -     cetirizine (zyrTEC) 10 MG tablet; Take 1 tablet by mouth Daily.  Dispense: 30 tablet; Refill: 1    2. Cough  -     cetirizine (zyrTEC) 10 MG tablet; Take 1 tablet by mouth Daily.  Dispense: 30 tablet; Refill: 1  -     guaiFENesin-codeine (GUAIFENESIN AC) 100-10 MG/5ML liquid; Take 5 mL by mouth 3 (Three) Times a Day As Needed for Cough.  Dispense: 180 mL; Refill: 0    3. Allergic rhinitis, unspecified seasonality, unspecified trigger  -     cetirizine (zyrTEC) 10 MG tablet; Take 1 tablet by mouth Daily.  Dispense: 30 tablet; Refill: 1    4. Hypothyroidism, unspecified type  -     levothyroxine (SYNTHROID, LEVOTHROID) 112 MCG tablet; Take 1 tablet by mouth Daily.  Dispense: 30 tablet; Refill: 0  -     TSH; Future    5. Hypertension, unspecified type    6. Eye pain, right      We will send patient to ophthalmology for further evaluation of the acute right eye swelling and pain, we will also start patient on sertraline to assist.  Would like to give patient a course of steroids, but he is allergic to those.  We will also give patient some  cough syrup to help with cough, will also refill patient's levothyroxine, but did discuss that he needs to have his TSH rechecked.  He is planning on doing this next week, as he has been out of the medication for a few days and wants to make sure it is accurate.  Patient also had a high blood pressure on exam today, states that his blood pressure has been trending upward, patient has formerly been on blood pressure medications, but was stopped on those because it would drop his blood pressure too quick.  Patient is going to check his blood pressure every day for the next 2 weeks, and follow back up with me in 2 weeks to evaluate if medication is needed.    Follow Up:  No follow-ups on file.    Patient was given instructions and counseling regarding his condition or for health maintenance advice. Please see specific information pulled into the AVS if appropriate.

## 2022-02-17 ENCOUNTER — TELEPHONE (OUTPATIENT)
Dept: FAMILY MEDICINE CLINIC | Facility: CLINIC | Age: 52
End: 2022-02-17

## 2022-02-22 ENCOUNTER — LAB (OUTPATIENT)
Dept: LAB | Facility: HOSPITAL | Age: 52
End: 2022-02-22

## 2022-02-22 DIAGNOSIS — E03.9 HYPOTHYROIDISM, UNSPECIFIED TYPE: ICD-10-CM

## 2022-02-22 LAB — TSH SERPL DL<=0.05 MIU/L-ACNC: 1.54 UIU/ML (ref 0.27–4.2)

## 2022-02-22 PROCEDURE — 84443 ASSAY THYROID STIM HORMONE: CPT

## 2022-02-22 PROCEDURE — 36415 COLL VENOUS BLD VENIPUNCTURE: CPT

## 2022-02-28 ENCOUNTER — OFFICE VISIT (OUTPATIENT)
Dept: FAMILY MEDICINE CLINIC | Facility: CLINIC | Age: 52
End: 2022-02-28

## 2022-02-28 VITALS
SYSTOLIC BLOOD PRESSURE: 142 MMHG | WEIGHT: 175.3 LBS | OXYGEN SATURATION: 97 % | DIASTOLIC BLOOD PRESSURE: 90 MMHG | BODY MASS INDEX: 25.09 KG/M2 | HEART RATE: 83 BPM | RESPIRATION RATE: 16 BRPM | TEMPERATURE: 97.6 F | HEIGHT: 70 IN

## 2022-02-28 DIAGNOSIS — R53.83 FATIGUE, UNSPECIFIED TYPE: ICD-10-CM

## 2022-02-28 DIAGNOSIS — Z11.59 ENCOUNTER FOR HEPATITIS C SCREENING TEST FOR LOW RISK PATIENT: ICD-10-CM

## 2022-02-28 DIAGNOSIS — Z13.220 SCREENING FOR LIPID DISORDERS: ICD-10-CM

## 2022-02-28 DIAGNOSIS — Z72.0 TOBACCO USE: ICD-10-CM

## 2022-02-28 DIAGNOSIS — R03.0 ELEVATED BLOOD PRESSURE READING: Primary | ICD-10-CM

## 2022-02-28 DIAGNOSIS — E03.9 HYPOTHYROIDISM, UNSPECIFIED TYPE: ICD-10-CM

## 2022-02-28 PROCEDURE — 99213 OFFICE O/P EST LOW 20 MIN: CPT

## 2022-02-28 RX ORDER — NICOTINE 21 MG/24HR
1 PATCH, TRANSDERMAL 24 HOURS TRANSDERMAL EVERY 24 HOURS
Qty: 28 EACH | Refills: 0 | Status: SHIPPED | OUTPATIENT
Start: 2022-02-28 | End: 2022-03-25

## 2022-02-28 RX ORDER — LEVOTHYROXINE SODIUM 112 UG/1
112 TABLET ORAL DAILY
Qty: 30 TABLET | Refills: 1 | Status: SHIPPED | OUTPATIENT
Start: 2022-02-28 | End: 2022-03-25 | Stop reason: SDUPTHER

## 2022-02-28 NOTE — PROGRESS NOTES
"Indra Herbert presents to [facility] with complaints of high blood pressure reading, tobacco use, and extreme fatigue.      History of Present Illness  This is a 51-year-old male who presents to the clinic with complaints of high blood pressure reading recently, tobacco use, and extreme fatigue.    Patient states past few times it has been to the doctor's offices noticed that his blood pressures been elevated, last reading on 2/7 was 152/92, and today's blood pressure reading was 142/90.  Patient is concerned that he may need to be put on medication.  Patient also states he has not been able to check this at home as he has lost his blood pressure cuff.  Patient denies any symptoms, no lightheadedness/dizziness no chest pain/shortness of breath, no headache.    Patient also does smoke, states that has been smoking for the last 8 to 9 years, has tried Chantix in the past, but it did not help at all.  Patient does wish to try nicotine patches to see if this will help him stop smoking.    Patient also reports that he is been extremely fatigued recently, states that he used to think this was related to his hypothyroidism, but recently had his TSH checked and it was normal.  Patient needs a refill on his levothyroxine.  Patient is also wondering if his testosterone is low, and would like to be checked for this.  Patient is also not had labs drawn in the past 2 years, and is okay with us repeating those as well.    The following portions of the patient's history were personally reviewed and updated as appropriate: allergies, current medications, past medical history, past surgical history, past family history, and past social history.       Objective   Vital Signs:   /90   Pulse 83   Temp 97.6 °F (36.4 °C)   Resp 16   Ht 177.8 cm (70\")   Wt 79.5 kg (175 lb 4.8 oz)   SpO2 97%   BMI 25.15 kg/m²     Body mass index is 25.15 kg/m².    All labs, imaging, test results, and " specialty provider notes reviewed with patient.     Physical Exam  Vitals reviewed.   Constitutional:       Appearance: Normal appearance.   Cardiovascular:      Rate and Rhythm: Normal rate and regular rhythm.      Pulses: Normal pulses.      Heart sounds: Normal heart sounds.   Pulmonary:      Effort: Pulmonary effort is normal.      Breath sounds: Normal breath sounds.   Neurological:      General: No focal deficit present.      Mental Status: He is alert and oriented to person, place, and time.            Assessment and Plan:  Diagnoses and all orders for this visit:    1. Elevated blood pressure reading (Primary)  -     CBC Auto Differential; Future  -     Comprehensive Metabolic Panel; Future    2. Hypothyroidism, unspecified type  -     levothyroxine (SYNTHROID, LEVOTHROID) 112 MCG tablet; Take 1 tablet by mouth Daily.  Dispense: 30 tablet; Refill: 1    3. Screening for lipid disorders  -     Lipid Panel; Future    4. Encounter for hepatitis C screening test for low risk patient  -     Hepatitis C Antibody; Future    5. Fatigue, unspecified type  -     Testosterone; Future    6. Tobacco use  -     nicotine (NICODERM CQ) 14 MG/24HR patch; Place 1 patch on the skin as directed by provider Daily.  Dispense: 28 each; Refill: 0      Instructed patient to get a blood pressure cuff at [facility] or another pharmacy and check at least 3-5 times weekly for the next month.  Bring those readings in with him, and can discuss with PCP with the blood pressure medication is needed at that time.  Also discussed that we will order patient labs, he is okay to go get those done after he leaves the office today, and if testosterone is low, he can discuss that at next visit also.  Did discuss that if patient has worsening headache/dizziness/lightheadedness/chest pain/shortness of breath he should seek emergency evaluation immediately.  Also will give patient some nicotine patches to see if this will help him quit smoking, also did  discuss possibly starting on Wellbutrin, but would like to try the patches first.    Follow Up:  No follow-ups on file.    Patient was given instructions and counseling regarding his condition or for health maintenance advice. Please see specific information pulled into the AVS if appropriate.

## 2022-03-25 ENCOUNTER — OFFICE VISIT (OUTPATIENT)
Dept: FAMILY MEDICINE CLINIC | Facility: CLINIC | Age: 52
End: 2022-03-25

## 2022-03-25 VITALS
HEART RATE: 72 BPM | WEIGHT: 174.2 LBS | DIASTOLIC BLOOD PRESSURE: 72 MMHG | OXYGEN SATURATION: 97 % | SYSTOLIC BLOOD PRESSURE: 146 MMHG | TEMPERATURE: 96.9 F | BODY MASS INDEX: 24.94 KG/M2 | HEIGHT: 70 IN

## 2022-03-25 DIAGNOSIS — F17.210 CIGARETTE NICOTINE DEPENDENCE WITHOUT COMPLICATION: Primary | ICD-10-CM

## 2022-03-25 DIAGNOSIS — I10 PRIMARY HYPERTENSION: ICD-10-CM

## 2022-03-25 DIAGNOSIS — E03.9 HYPOTHYROIDISM, UNSPECIFIED TYPE: ICD-10-CM

## 2022-03-25 PROBLEM — M51.37 DEGENERATION OF LUMBOSACRAL INTERVERTEBRAL DISC: Status: ACTIVE | Noted: 2018-01-29

## 2022-03-25 PROBLEM — M19.90 ARTHRITIS: Status: ACTIVE | Noted: 2022-03-25

## 2022-03-25 PROBLEM — Z79.4 ENCOUNTER FOR LONG-TERM (CURRENT) USE OF INSULIN: Status: ACTIVE | Noted: 2018-01-29

## 2022-03-25 PROBLEM — E07.9 THYROID DISEASE: Status: ACTIVE | Noted: 2022-03-25

## 2022-03-25 PROBLEM — R25.2 MUSCLE CRAMPS: Status: ACTIVE | Noted: 2022-03-25

## 2022-03-25 PROBLEM — M79.606 LEG PAIN: Status: ACTIVE | Noted: 2022-03-25

## 2022-03-25 PROBLEM — M46.1 INFLAMMATION OF SACROILIAC JOINT: Status: ACTIVE | Noted: 2022-03-25

## 2022-03-25 PROBLEM — J45.909 ASTHMA: Status: ACTIVE | Noted: 2022-03-25

## 2022-03-25 PROBLEM — M51.379 DEGENERATION OF LUMBOSACRAL INTERVERTEBRAL DISC: Status: ACTIVE | Noted: 2018-01-29

## 2022-03-25 PROBLEM — C80.1 CANCER (HCC): Status: ACTIVE | Noted: 2022-03-25

## 2022-03-25 PROBLEM — G40.909 EPILEPSY: Status: ACTIVE | Noted: 2022-03-25

## 2022-03-25 PROBLEM — R56.9 SEIZURES (HCC): Status: ACTIVE | Noted: 2022-03-25

## 2022-03-25 PROCEDURE — 99214 OFFICE O/P EST MOD 30 MIN: CPT | Performed by: NURSE PRACTITIONER

## 2022-03-25 RX ORDER — LEVOTHYROXINE SODIUM 112 UG/1
112 TABLET ORAL DAILY
Qty: 90 TABLET | Refills: 1 | Status: SHIPPED | OUTPATIENT
Start: 2022-03-25 | End: 2022-06-28 | Stop reason: SDUPTHER

## 2022-03-25 RX ORDER — LOSARTAN POTASSIUM 25 MG/1
25 TABLET ORAL DAILY
Qty: 90 TABLET | Refills: 1 | Status: SHIPPED | OUTPATIENT
Start: 2022-03-25

## 2022-03-25 RX ORDER — NICOTINE 21-14-7MG
KIT TRANSDERMAL
Qty: 42 EACH | Refills: 0 | Status: SHIPPED | OUTPATIENT
Start: 2022-03-25 | End: 2022-05-06

## 2022-03-25 NOTE — PROGRESS NOTES
"Chief Complaint  Hypertension (Follow up from [facility] visit) and Fatigue    Subjective          Indra Herbert presents to [facility]  Patient presents to the office today for follow-up.  Patient was seen by another provider in the office where he had elevated blood pressures.  Blood pressure today is 146/72.  He states that it has been running about the same.  Patient also states that he was seen for fatigue and he has labs pending.  I did explain to the patient once we receive the results of these labs we could treat his fatigue appropriately.  I did state that we were going to start him on losartan 25 mg for his blood pressure.  Also states that he needs a refill of his Synthroid.  Patient also states that he is trying to quit smoking and would like the patches sent to the pharmacy that allow him to start with a higher dose and work his way down.  He states that he was previously written step 2 on the patches.  He denies any other concerns or complaints at this time.      Objective   Vital Signs:   /72 (BP Location: Right arm, Patient Position: Sitting, Cuff Size: Adult)   Pulse 72   Temp 96.9 °F (36.1 °C) (Temporal)   Ht 177.8 cm (70\")   Wt 79 kg (174 lb 3.2 oz)   SpO2 97%   BMI 25.00 kg/m²     BMI is above normal parameters. Recommendations: nutrition counseling/recommendations       Physical Exam  Vitals reviewed.   Constitutional:       Appearance: Normal appearance.   Cardiovascular:      Rate and Rhythm: Normal rate and regular rhythm.      Heart sounds: Normal heart sounds, S1 normal and S2 normal. No murmur heard.  Pulmonary:      Effort: Pulmonary effort is normal. No respiratory distress.      Breath sounds: Normal breath sounds.   Skin:     General: Skin is warm and dry.   Neurological:      Mental Status: He is alert and oriented to person, place, and time.   Psychiatric:         Attention and Perception: Attention normal.         Mood and Affect: Mood " normal.         Behavior: Behavior normal.        Result Review :                Assessment and Plan    Diagnoses and all orders for this visit:    1. Cigarette nicotine dependence without complication (Primary)  -     Nicotine 21-14-7 MG/24HR kit; Place 21 mg on the skin as directed by provider Daily for 14 days, THEN 14 mg Daily for 14 days, THEN 7 mg Daily for 14 days.  Dispense: 42 each; Refill: 0    2. Hypothyroidism, unspecified type  -     levothyroxine (SYNTHROID, LEVOTHROID) 112 MCG tablet; Take 1 tablet by mouth Daily.  Dispense: 90 tablet; Refill: 1    3. Primary hypertension  -     losartan (Cozaar) 25 MG tablet; Take 1 tablet by mouth Daily.  Dispense: 90 tablet; Refill: 1        Follow Up   Return in about 6 months (around 9/25/2022) for Recheck.  Patient was given instructions and counseling regarding his condition or for health maintenance advice. Please see specific information pulled into the AVS if appropriate.

## 2022-05-23 ENCOUNTER — TRANSCRIBE ORDERS (OUTPATIENT)
Dept: LAB | Facility: HOSPITAL | Age: 52
End: 2022-05-23

## 2022-05-23 ENCOUNTER — LAB (OUTPATIENT)
Dept: LAB | Facility: HOSPITAL | Age: 52
End: 2022-05-23

## 2022-05-23 DIAGNOSIS — R53.83 FATIGUE, UNSPECIFIED TYPE: ICD-10-CM

## 2022-05-23 DIAGNOSIS — Z13.220 SCREENING FOR LIPID DISORDERS: ICD-10-CM

## 2022-05-23 DIAGNOSIS — R03.0 ELEVATED BLOOD PRESSURE READING: ICD-10-CM

## 2022-05-23 DIAGNOSIS — Z11.59 ENCOUNTER FOR HEPATITIS C SCREENING TEST FOR LOW RISK PATIENT: ICD-10-CM

## 2022-05-23 DIAGNOSIS — G24.5 BLEPHAROSPASM: ICD-10-CM

## 2022-05-23 DIAGNOSIS — H02.841 EDEMA OF RIGHT UPPER EYELID: ICD-10-CM

## 2022-05-23 DIAGNOSIS — H57.11 PAIN IN RIGHT EYE: ICD-10-CM

## 2022-05-23 DIAGNOSIS — H57.11 PAIN IN RIGHT EYE: Primary | ICD-10-CM

## 2022-05-23 LAB
ALBUMIN SERPL-MCNC: 4.2 G/DL (ref 3.5–5.2)
ALBUMIN/GLOB SERPL: 1.1 G/DL
ALP SERPL-CCNC: 68 U/L (ref 39–117)
ALT SERPL W P-5'-P-CCNC: 7 U/L (ref 1–41)
ANION GAP SERPL CALCULATED.3IONS-SCNC: 11.1 MMOL/L (ref 5–15)
AST SERPL-CCNC: 10 U/L (ref 1–40)
BASOPHILS # BLD AUTO: 0.04 10*3/MM3 (ref 0–0.2)
BASOPHILS NFR BLD AUTO: 0.4 % (ref 0–1.5)
BILIRUB SERPL-MCNC: 0.6 MG/DL (ref 0–1.2)
BUN SERPL-MCNC: 9 MG/DL (ref 6–20)
BUN/CREAT SERPL: 7.5 (ref 7–25)
CALCIUM SPEC-SCNC: 10.2 MG/DL (ref 8.6–10.5)
CHLORIDE SERPL-SCNC: 98 MMOL/L (ref 98–107)
CHOLEST SERPL-MCNC: 158 MG/DL (ref 0–200)
CO2 SERPL-SCNC: 26.9 MMOL/L (ref 22–29)
CREAT SERPL-MCNC: 1.2 MG/DL (ref 0.76–1.27)
DEPRECATED RDW RBC AUTO: 42.6 FL (ref 37–54)
EGFRCR SERPLBLD CKD-EPI 2021: 74.1 ML/MIN/1.73
EOSINOPHIL # BLD AUTO: 0.03 10*3/MM3 (ref 0–0.4)
EOSINOPHIL NFR BLD AUTO: 0.3 % (ref 0.3–6.2)
ERYTHROCYTE [DISTWIDTH] IN BLOOD BY AUTOMATED COUNT: 12 % (ref 12.3–15.4)
GLOBULIN UR ELPH-MCNC: 4 GM/DL
GLUCOSE SERPL-MCNC: 116 MG/DL (ref 65–99)
HCT VFR BLD AUTO: 48.7 % (ref 37.5–51)
HCV AB SER DONR QL: NORMAL
HDLC SERPL-MCNC: 37 MG/DL (ref 40–60)
HGB BLD-MCNC: 16.7 G/DL (ref 13–17.7)
IMM GRANULOCYTES # BLD AUTO: 0.03 10*3/MM3 (ref 0–0.05)
IMM GRANULOCYTES NFR BLD AUTO: 0.3 % (ref 0–0.5)
LDLC SERPL CALC-MCNC: 96 MG/DL (ref 0–100)
LDLC/HDLC SERPL: 2.51 {RATIO}
LYMPHOCYTES # BLD AUTO: 1.62 10*3/MM3 (ref 0.7–3.1)
LYMPHOCYTES NFR BLD AUTO: 18.2 % (ref 19.6–45.3)
MCH RBC QN AUTO: 32.6 PG (ref 26.6–33)
MCHC RBC AUTO-ENTMCNC: 34.3 G/DL (ref 31.5–35.7)
MCV RBC AUTO: 95.1 FL (ref 79–97)
MONOCYTES # BLD AUTO: 0.52 10*3/MM3 (ref 0.1–0.9)
MONOCYTES NFR BLD AUTO: 5.8 % (ref 5–12)
NEUTROPHILS NFR BLD AUTO: 6.68 10*3/MM3 (ref 1.7–7)
NEUTROPHILS NFR BLD AUTO: 75 % (ref 42.7–76)
NRBC BLD AUTO-RTO: 0 /100 WBC (ref 0–0.2)
PLATELET # BLD AUTO: 207 10*3/MM3 (ref 140–450)
PMV BLD AUTO: 10.8 FL (ref 6–12)
POTASSIUM SERPL-SCNC: 4.4 MMOL/L (ref 3.5–5.2)
PROT SERPL-MCNC: 8.2 G/DL (ref 6–8.5)
RBC # BLD AUTO: 5.12 10*6/MM3 (ref 4.14–5.8)
SODIUM SERPL-SCNC: 136 MMOL/L (ref 136–145)
T3 SERPL-MCNC: 89.9 NG/DL (ref 80–200)
T4 SERPL-MCNC: 11.9 MCG/DL (ref 4.5–11.7)
TESTOST SERPL-MCNC: 258 NG/DL (ref 249–836)
TRIGL SERPL-MCNC: 141 MG/DL (ref 0–150)
TSH SERPL DL<=0.05 MIU/L-ACNC: 0.62 UIU/ML (ref 0.27–4.2)
VLDLC SERPL-MCNC: 25 MG/DL (ref 5–40)
WBC NRBC COR # BLD: 8.92 10*3/MM3 (ref 3.4–10.8)

## 2022-05-23 PROCEDURE — 84480 ASSAY TRIIODOTHYRONINE (T3): CPT

## 2022-05-23 PROCEDURE — 80053 COMPREHEN METABOLIC PANEL: CPT

## 2022-05-23 PROCEDURE — 84403 ASSAY OF TOTAL TESTOSTERONE: CPT

## 2022-05-23 PROCEDURE — 36415 COLL VENOUS BLD VENIPUNCTURE: CPT

## 2022-05-23 PROCEDURE — 85025 COMPLETE CBC W/AUTO DIFF WBC: CPT

## 2022-05-23 PROCEDURE — 84436 ASSAY OF TOTAL THYROXINE: CPT

## 2022-05-23 PROCEDURE — 80061 LIPID PANEL: CPT

## 2022-05-23 PROCEDURE — 86803 HEPATITIS C AB TEST: CPT

## 2022-05-23 PROCEDURE — 84443 ASSAY THYROID STIM HORMONE: CPT

## 2022-05-23 PROCEDURE — 84445 ASSAY OF TSI GLOBULIN: CPT

## 2022-05-25 LAB — TSI SER-ACNC: 0.35 IU/L (ref 0–0.55)

## 2022-06-28 ENCOUNTER — OFFICE VISIT (OUTPATIENT)
Dept: FAMILY MEDICINE CLINIC | Facility: CLINIC | Age: 52
End: 2022-06-28

## 2022-06-28 VITALS
WEIGHT: 169.4 LBS | HEART RATE: 92 BPM | TEMPERATURE: 97.5 F | SYSTOLIC BLOOD PRESSURE: 142 MMHG | BODY MASS INDEX: 24.25 KG/M2 | OXYGEN SATURATION: 97 % | DIASTOLIC BLOOD PRESSURE: 80 MMHG | HEIGHT: 70 IN

## 2022-06-28 DIAGNOSIS — E03.9 HYPOTHYROIDISM, UNSPECIFIED TYPE: ICD-10-CM

## 2022-06-28 DIAGNOSIS — H57.89 EYE SWELLING, RIGHT: ICD-10-CM

## 2022-06-28 DIAGNOSIS — J30.9 ALLERGIC RHINITIS, UNSPECIFIED SEASONALITY, UNSPECIFIED TRIGGER: ICD-10-CM

## 2022-06-28 DIAGNOSIS — H53.9 VISION CHANGES: Primary | ICD-10-CM

## 2022-06-28 DIAGNOSIS — H53.141 EYES SENSITIVE TO LIGHT, RIGHT: ICD-10-CM

## 2022-06-28 PROCEDURE — 99214 OFFICE O/P EST MOD 30 MIN: CPT | Performed by: NURSE PRACTITIONER

## 2022-06-28 RX ORDER — LEVOTHYROXINE SODIUM 112 UG/1
112 TABLET ORAL DAILY
Qty: 90 TABLET | Refills: 1 | Status: SHIPPED | OUTPATIENT
Start: 2022-06-28

## 2022-06-28 RX ORDER — CETIRIZINE HYDROCHLORIDE 10 MG/1
10 TABLET ORAL DAILY
Qty: 30 TABLET | Refills: 1 | Status: SHIPPED | OUTPATIENT
Start: 2022-06-28

## 2022-06-28 RX ORDER — MONTELUKAST SODIUM 10 MG/1
10 TABLET ORAL NIGHTLY
Qty: 90 TABLET | Refills: 1 | Status: SHIPPED | OUTPATIENT
Start: 2022-06-28

## 2022-06-28 NOTE — PROGRESS NOTES
"Chief Complaint  Eye Problem (Follow up eye provider visit)    Subjective        Indra Herbert presents to [facility]  She presents to the office today for follow-up regarding his right eye.  Patient has been having swelling to his right eye as well as light sensitivity.  Did have a CT completed and has followed up with [facility] ophthalmologist.  Patient states that his eye duarte frequently has difficulties opening and it all the way.  He states that ophthalmology was unsure of what was causing the symptoms.  Did discuss referring patient to a different ophthalmologist for second opinion.  She does request refills of his Zyrtec and his Synthroid.  I also discussed placing patient on Singulair to help with the allergy like response    Eye Problem         Objective   Vital Signs:  /80 (BP Location: Right arm, Patient Position: Sitting, Cuff Size: Adult)   Pulse 92   Temp 97.5 °F (36.4 °C) (Temporal)   Ht 177.8 cm (70\")   Wt 76.8 kg (169 lb 6.4 oz)   SpO2 97%   BMI 24.31 kg/m²   Estimated body mass index is 24.31 kg/m² as calculated from the following:    Height as of this encounter: 177.8 cm (70\").    Weight as of this encounter: 76.8 kg (169 lb 6.4 oz).    BMI is within normal parameters. No other follow-up for BMI required.      Physical Exam  Vitals reviewed.   Constitutional:       Appearance: Normal appearance.   Eyes:      Extraocular Movements: Extraocular movements intact.      Conjunctiva/sclera: Conjunctivae normal.      Pupils: Pupils are equal, round, and reactive to light.        Comments: Swelling noted to the right upper lid.  No erythema noted.   Cardiovascular:      Rate and Rhythm: Normal rate and regular rhythm.      Heart sounds: Normal heart sounds, S1 normal and S2 normal. No murmur heard.  Pulmonary:      Effort: Pulmonary effort is normal. No respiratory distress.      Breath sounds: Normal breath sounds.   Skin:     General: Skin " is warm and dry.   Neurological:      Mental Status: He is alert and oriented to person, place, and time.   Psychiatric:         Attention and Perception: Attention normal.         Mood and Affect: Mood normal.         Behavior: Behavior normal.        Result Review :               Assessment and Plan   Diagnoses and all orders for this visit:    1. Vision changes (Primary)  -     Ambulatory Referral to Ophthalmology    2. Eye swelling, right  -     cetirizine (zyrTEC) 10 MG tablet; Take 1 tablet by mouth Daily.  Dispense: 30 tablet; Refill: 1  -     Ambulatory Referral to Ophthalmology    3. Allergic rhinitis, unspecified seasonality, unspecified trigger  -     cetirizine (zyrTEC) 10 MG tablet; Take 1 tablet by mouth Daily.  Dispense: 30 tablet; Refill: 1  -     montelukast (Singulair) 10 MG tablet; Take 1 tablet by mouth Every Night.  Dispense: 90 tablet; Refill: 1    4. Hypothyroidism, unspecified type  -     levothyroxine (SYNTHROID, LEVOTHROID) 112 MCG tablet; Take 1 tablet by mouth Daily.  Dispense: 90 tablet; Refill: 1    5. Eyes sensitive to light, right  -     Ambulatory Referral to Ophthalmology             Follow Up   Return if symptoms worsen or fail to improve.  Patient was given instructions and counseling regarding his condition or for health maintenance advice. Please see specific information pulled into the AVS if appropriate.

## 2022-07-26 ENCOUNTER — TELEPHONE (OUTPATIENT)
Dept: FAMILY MEDICINE CLINIC | Facility: CLINIC | Age: 52
End: 2022-07-26

## 2022-07-26 NOTE — TELEPHONE ENCOUNTER
Caller: Indra Herbert    Relationship: Self    Best call back number: [phone]    What form or medical record are you requesting: WORK RELEASE    Who is requesting this form or medical record from you: SELF    How would you like to receive the form or medical records (pick-up, mail, fax):     Timeframe paperwork needed: ASAP    Additional notes: PATIENT NEEDS A NOTE ALLOWING HIM TO RETURN TO WORK WITH NO RESTRICTIONS. HE WOULD LIKE A CALL WHEN THE NOTE IS READY TO BE PICKED UP.

## 2022-07-26 NOTE — TELEPHONE ENCOUNTER
Per Omar, Please call patient to see how he is doing.  We can provide letter to return to work if no complaints     Spoke with the patient. He is doing much better and the swelling in his eye has gone down and he can see out of it again. He is ready to go back to work, so I let him know that the letter will be completed and ready for pickup.

## 2022-08-23 ENCOUNTER — OFFICE VISIT (OUTPATIENT)
Dept: FAMILY MEDICINE CLINIC | Facility: CLINIC | Age: 52
End: 2022-08-23

## 2022-08-23 VITALS
HEART RATE: 71 BPM | TEMPERATURE: 98 F | OXYGEN SATURATION: 99 % | DIASTOLIC BLOOD PRESSURE: 90 MMHG | RESPIRATION RATE: 18 BRPM | SYSTOLIC BLOOD PRESSURE: 134 MMHG

## 2022-08-23 DIAGNOSIS — R53.83 FATIGUE, UNSPECIFIED TYPE: ICD-10-CM

## 2022-08-23 DIAGNOSIS — R05.9 COUGH: Primary | ICD-10-CM

## 2022-08-23 DIAGNOSIS — J02.9 SORE THROAT: ICD-10-CM

## 2022-08-23 DIAGNOSIS — R09.89 CHEST CONGESTION: ICD-10-CM

## 2022-08-23 LAB
EXPIRATION DATE: NORMAL
FLUAV AG NPH QL: NEGATIVE
FLUAV AG UPPER RESP QL IA.RAPID: NOT DETECTED
FLUBV AG NPH QL: NEGATIVE
FLUBV AG UPPER RESP QL IA.RAPID: NOT DETECTED
INTERNAL CONTROL: NORMAL
Lab: NORMAL
S PYO AG THROAT QL: NEGATIVE
SARS-COV-2 AG UPPER RESP QL IA.RAPID: NOT DETECTED

## 2022-08-23 PROCEDURE — 87880 STREP A ASSAY W/OPTIC: CPT

## 2022-08-23 PROCEDURE — 87428 SARSCOV & INF VIR A&B AG IA: CPT

## 2022-08-23 PROCEDURE — 99213 OFFICE O/P EST LOW 20 MIN: CPT

## 2022-08-23 RX ORDER — DEXTROMETHORPHAN HYDROBROMIDE AND PROMETHAZINE HYDROCHLORIDE 15; 6.25 MG/5ML; MG/5ML
5 SYRUP ORAL 4 TIMES DAILY PRN
Qty: 180 ML | Refills: 0 | Status: SHIPPED | OUTPATIENT
Start: 2022-08-23

## 2022-08-23 RX ORDER — AZITHROMYCIN 250 MG/1
TABLET, FILM COATED ORAL
Qty: 6 TABLET | Refills: 0 | Status: SHIPPED | OUTPATIENT
Start: 2022-08-23

## 2022-08-23 RX ORDER — GUAIFENESIN 600 MG/1
1200 TABLET, EXTENDED RELEASE ORAL 2 TIMES DAILY
Qty: 60 TABLET | Refills: 0 | Status: SHIPPED | OUTPATIENT
Start: 2022-08-23

## 2022-08-23 RX ORDER — BENZONATATE 100 MG/1
100 CAPSULE ORAL 3 TIMES DAILY PRN
Qty: 60 CAPSULE | Refills: 0 | Status: SHIPPED | OUTPATIENT
Start: 2022-08-23

## 2022-08-23 NOTE — PROGRESS NOTES
Indra Herbert presents to [facility] with complaints of fatigue, sore throat, cough, chest congestion.      History of Present Illness  This is a 52-year-old male who presents to clinic with complains of fatigue, sore throat, cough, and chest congestion.    Patient states that his symptoms really started on Sunday, states that his mother had been sick, is wondering if he could have caught what she had had.  Patient states that originally started with a sore throat, then progressed to a very severe cough that is kept him up at night and is so strong that it can even be painful.  Patient states that he is not coughing anything up, just feels as though he is got a lot of congestion in his chest and nothing seems to really be helping.  Has tried several things over-the-counter, states that he is tried several different decongestants, is also taken several cough medications and nothing seems to really be helping.  He also admits to some fatigue, states that he just feels more tired, knows that it is related to the infection.  Also has a really bad sore throat, states that the cough is making that worse as well.  Denies any ear fullness/itchiness, does have some minor sinus congestion.  Denies any fever/chills/body aches.  Denies any GI involvement.  Denies any shortness of breath or chest pain.  Does not recall being around anybody has been sick recently.    The following portions of the patient's history were personally reviewed and updated as appropriate: allergies, current medications, past medical history, past surgical history, past family history, and past social history.       Objective   Vital Signs:   /90   Pulse 71   Temp 98 °F (36.7 °C)   Resp 18   SpO2 99%     There is no height or weight on file to calculate BMI.    All labs, imaging, test results, and specialty provider notes reviewed with patient.     Physical Exam  Vitals reviewed.   Constitutional:        Appearance: Normal appearance.   Cardiovascular:      Rate and Rhythm: Normal rate and regular rhythm.      Pulses: Normal pulses.      Heart sounds: Normal heart sounds.   Pulmonary:      Effort: Pulmonary effort is normal.      Breath sounds: Normal breath sounds.   Neurological:      General: No focal deficit present.      Mental Status: He is alert and oriented to person, place, and time.          Assessment and Plan:  Diagnoses and all orders for this visit:    1. Cough (Primary)  -     azithromycin (Zithromax Z-Chao) 250 MG tablet; Take 2 tablets by mouth on day 1, then 1 tablet daily on days 2-5  Dispense: 6 tablet; Refill: 0  -     promethazine-dextromethorphan (PROMETHAZINE-DM) 6.25-15 MG/5ML syrup; Take 5 mL by mouth 4 (Four) Times a Day As Needed for Cough.  Dispense: 180 mL; Refill: 0  -     benzonatate (Tessalon Perles) 100 MG capsule; Take 1 capsule by mouth 3 (Three) Times a Day As Needed for Cough.  Dispense: 60 capsule; Refill: 0  -     POCT SARS-CoV-2 Antigen FRANTZ + Flu    2. Chest congestion  -     azithromycin (Zithromax Z-Chao) 250 MG tablet; Take 2 tablets by mouth on day 1, then 1 tablet daily on days 2-5  Dispense: 6 tablet; Refill: 0  -     guaiFENesin (Mucinex) 600 MG 12 hr tablet; Take 2 tablets by mouth 2 (Two) Times a Day.  Dispense: 60 tablet; Refill: 0  -     POC Rapid Strep A    3. Fatigue, unspecified type  -     azithromycin (Zithromax Z-Chao) 250 MG tablet; Take 2 tablets by mouth on day 1, then 1 tablet daily on days 2-5  Dispense: 6 tablet; Refill: 0  -     POCT Influenza A/B    4. Sore throat  -     azithromycin (Zithromax Z-Chao) 250 MG tablet; Take 2 tablets by mouth on day 1, then 1 tablet daily on days 2-5  Dispense: 6 tablet; Refill: 0      When he did swab patient for COVID, flu, and strep all which were negative.  We will go ahead and treat with a Z-Chao, as it does sound like he is got some bronchitis that is developing as well.  Cannot take prednisone due to angioedema.  We  will also give him some Mucinex to help with decongestion, give him a cough syrup to take at night to help with sleep as well as some cough medication to help during the day.  Discussed that if his symptoms or not improving, he develops worsening shortness of breath, can consider getting a chest x-ray at that time.    Follow Up:  No follow-ups on file.    Patient was given instructions and counseling regarding his condition or for health maintenance advice. Please see specific information pulled into the AVS if appropriate.

## 2022-08-29 ENCOUNTER — TELEPHONE (OUTPATIENT)
Dept: FAMILY MEDICINE CLINIC | Facility: CLINIC | Age: 52
End: 2022-08-29

## 2022-08-29 NOTE — TELEPHONE ENCOUNTER
Caller: Indra Herbert    Relationship: Self    Best call back number: [phone]    What form or medical record are you requesting: WORK NOTE     Who is requesting this form or medical record from you: EMPLOYER     How would you like to receive the form or medical records (pick-up, mail, fax): CALL HIM AND HE WILL COME PICK IT UP       Timeframe paperwork needed: BY THE END OF TODAY     Additional notes: IS RETURNING TO WORK TOMORROW AND IT NEEDS TO SAY THAT HE CAN RETURN TO WORK TOMORROW WITH NO RESTRICTIONS OR IF HE HAS RESTRICTION IT NEEDS TO LIST WHAT THEY ARE.

## 2022-08-29 NOTE — TELEPHONE ENCOUNTER
Caller: Indra Herbert    Relationship to patient: Self    Best call back number: [phone] OKAY TO LEAVE MESSAGE ON PHONE    Patient is needing: PATIENT CALLED IN AND WOULD ALSO LIKE NOTE TO BE FAXED [phone] WHEN IT IS FINISHED AS WELL AS A CALL BACK WHEN HE CAN  COPY

## 2023-04-12 DIAGNOSIS — E03.9 HYPOTHYROIDISM, UNSPECIFIED TYPE: ICD-10-CM

## 2023-04-12 RX ORDER — LEVOTHYROXINE SODIUM 112 UG/1
112 TABLET ORAL DAILY
Qty: 90 TABLET | Refills: 1 | Status: SHIPPED | OUTPATIENT
Start: 2023-04-12

## 2023-10-14 DIAGNOSIS — E03.9 HYPOTHYROIDISM, UNSPECIFIED TYPE: ICD-10-CM

## 2023-10-16 RX ORDER — LEVOTHYROXINE SODIUM 112 UG/1
112 TABLET ORAL DAILY
Qty: 90 TABLET | Refills: 1 | OUTPATIENT
Start: 2023-10-16

## 2023-10-17 DIAGNOSIS — E03.9 HYPOTHYROIDISM, UNSPECIFIED TYPE: Primary | ICD-10-CM

## 2023-10-17 DIAGNOSIS — E03.9 HYPOTHYROIDISM, UNSPECIFIED TYPE: ICD-10-CM

## 2023-10-17 DIAGNOSIS — I10 PRIMARY HYPERTENSION: ICD-10-CM

## 2023-10-17 DIAGNOSIS — Z13.6 SCREENING FOR CARDIOVASCULAR CONDITION: ICD-10-CM

## 2023-10-17 RX ORDER — LEVOTHYROXINE SODIUM 112 UG/1
112 TABLET ORAL DAILY
Qty: 90 TABLET | Refills: 1 | OUTPATIENT
Start: 2023-10-17

## 2023-10-17 RX ORDER — LEVOTHYROXINE SODIUM 112 UG/1
112 TABLET ORAL DAILY
Qty: 30 TABLET | Refills: 0 | Status: SHIPPED | OUTPATIENT
Start: 2023-10-17

## 2023-10-17 NOTE — TELEPHONE ENCOUNTER
Patient aware appointment and lab work is needed for medication refills.     Patient is currently scheduled for 10/24/23. Patient requesting to know if short supply of medication can be called in until that appointment due to already being without medication for 1 week.

## 2023-10-17 NOTE — TELEPHONE ENCOUNTER
Pt needs an apt before refills, hasn't been here in over 1 year. Pt is coming in for an apt, and his TSH rechecked.

## 2023-10-17 NOTE — TELEPHONE ENCOUNTER
Caller: Indra Herbert    Relationship: Self    Best call back number: [phone]    Requested Prescriptions:   Requested Prescriptions     Pending Prescriptions Disp Refills    levothyroxine (SYNTHROID, LEVOTHROID) 112 MCG tablet [Pharmacy Med Name: LEVOTHYROXINE SODIUM 112  Tablet] 90 tablet 1     Sig: TAKE 1 TABLET BY MOUTH EVERY DAY        [facility] be sent[facility] [place] [facility] [address]  [phone] [facility] [phone]      Last office visit with prescribing clinician: 6/28/2022   Last telemedicine visit with prescribing clinician: Visit date not found   Next office visit with prescribing clinician: Visit date not found     Additional details provided by patient: PATIENT HAS BEEN OUT SINCE 10/13/2023    Does the patient have less than a 3 day supply:  [x] Yes  [] No    Would you like a call back once the refill request has been completed: [] Yes [x] No    If the office needs to give you a call back, can they leave a voicemail: [] Yes [x] No    Ketty Eugene Rep   10/17/23 09:58 EDT

## 2023-10-18 ENCOUNTER — LAB (OUTPATIENT)
Dept: LAB | Facility: HOSPITAL | Age: 53
End: 2023-10-18
Payer: MEDICAID

## 2023-10-18 DIAGNOSIS — E03.9 HYPOTHYROIDISM, UNSPECIFIED TYPE: ICD-10-CM

## 2023-10-18 DIAGNOSIS — I10 PRIMARY HYPERTENSION: ICD-10-CM

## 2023-10-18 DIAGNOSIS — Z13.6 SCREENING FOR CARDIOVASCULAR CONDITION: ICD-10-CM

## 2023-10-18 LAB
ALBUMIN SERPL-MCNC: 4.5 G/DL (ref 3.5–5.2)
ALBUMIN/GLOB SERPL: 1.6 G/DL
ALP SERPL-CCNC: 58 U/L (ref 39–117)
ALT SERPL W P-5'-P-CCNC: 14 U/L (ref 1–41)
ANION GAP SERPL CALCULATED.3IONS-SCNC: 6.8 MMOL/L (ref 5–15)
AST SERPL-CCNC: 13 U/L (ref 1–40)
BILIRUB SERPL-MCNC: 0.4 MG/DL (ref 0–1.2)
BUN SERPL-MCNC: 19 MG/DL (ref 6–20)
BUN/CREAT SERPL: 15.2 (ref 7–25)
CALCIUM SPEC-SCNC: 9.7 MG/DL (ref 8.6–10.5)
CHLORIDE SERPL-SCNC: 101 MMOL/L (ref 98–107)
CHOLEST SERPL-MCNC: 177 MG/DL (ref 0–200)
CO2 SERPL-SCNC: 29.2 MMOL/L (ref 22–29)
CREAT SERPL-MCNC: 1.25 MG/DL (ref 0.76–1.27)
DEPRECATED RDW RBC AUTO: 42.8 FL (ref 37–54)
EGFRCR SERPLBLD CKD-EPI 2021: 69.7 ML/MIN/1.73
ERYTHROCYTE [DISTWIDTH] IN BLOOD BY AUTOMATED COUNT: 12.5 % (ref 12.3–15.4)
GLOBULIN UR ELPH-MCNC: 2.9 GM/DL
GLUCOSE SERPL-MCNC: 88 MG/DL (ref 65–99)
HCT VFR BLD AUTO: 48.3 % (ref 37.5–51)
HDLC SERPL-MCNC: 47 MG/DL (ref 40–60)
HGB BLD-MCNC: 17 G/DL (ref 13–17.7)
LDLC SERPL CALC-MCNC: 111 MG/DL (ref 0–100)
LDLC/HDLC SERPL: 2.31 {RATIO}
MCH RBC QN AUTO: 33.1 PG (ref 26.6–33)
MCHC RBC AUTO-ENTMCNC: 35.2 G/DL (ref 31.5–35.7)
MCV RBC AUTO: 94 FL (ref 79–97)
PLATELET # BLD AUTO: 173 10*3/MM3 (ref 140–450)
PMV BLD AUTO: 11.4 FL (ref 6–12)
POTASSIUM SERPL-SCNC: 4.6 MMOL/L (ref 3.5–5.2)
PROT SERPL-MCNC: 7.4 G/DL (ref 6–8.5)
RBC # BLD AUTO: 5.14 10*6/MM3 (ref 4.14–5.8)
SODIUM SERPL-SCNC: 137 MMOL/L (ref 136–145)
T4 FREE SERPL-MCNC: 1.43 NG/DL (ref 0.93–1.7)
TRIGL SERPL-MCNC: 106 MG/DL (ref 0–150)
TSH SERPL DL<=0.05 MIU/L-ACNC: 0.49 UIU/ML (ref 0.27–4.2)
VLDLC SERPL-MCNC: 19 MG/DL (ref 5–40)
WBC NRBC COR # BLD: 7.58 10*3/MM3 (ref 3.4–10.8)

## 2023-10-18 PROCEDURE — 80053 COMPREHEN METABOLIC PANEL: CPT

## 2023-10-18 PROCEDURE — 80061 LIPID PANEL: CPT

## 2023-10-18 PROCEDURE — 36415 COLL VENOUS BLD VENIPUNCTURE: CPT

## 2023-10-18 PROCEDURE — 84439 ASSAY OF FREE THYROXINE: CPT

## 2023-10-18 PROCEDURE — 84443 ASSAY THYROID STIM HORMONE: CPT

## 2023-10-18 PROCEDURE — 85027 COMPLETE CBC AUTOMATED: CPT

## 2023-10-24 ENCOUNTER — OFFICE VISIT (OUTPATIENT)
Dept: FAMILY MEDICINE CLINIC | Facility: CLINIC | Age: 53
End: 2023-10-24
Payer: MEDICAID

## 2023-10-24 VITALS
SYSTOLIC BLOOD PRESSURE: 130 MMHG | TEMPERATURE: 97.3 F | HEART RATE: 79 BPM | HEIGHT: 70 IN | WEIGHT: 169.6 LBS | BODY MASS INDEX: 24.28 KG/M2 | DIASTOLIC BLOOD PRESSURE: 84 MMHG | OXYGEN SATURATION: 98 %

## 2023-10-24 DIAGNOSIS — J01.00 ACUTE NON-RECURRENT MAXILLARY SINUSITIS: ICD-10-CM

## 2023-10-24 DIAGNOSIS — E03.9 HYPOTHYROIDISM, UNSPECIFIED TYPE: ICD-10-CM

## 2023-10-24 DIAGNOSIS — G47.00 INSOMNIA, UNSPECIFIED TYPE: Primary | ICD-10-CM

## 2023-10-24 PROCEDURE — 3079F DIAST BP 80-89 MM HG: CPT | Performed by: NURSE PRACTITIONER

## 2023-10-24 PROCEDURE — 99214 OFFICE O/P EST MOD 30 MIN: CPT | Performed by: NURSE PRACTITIONER

## 2023-10-24 PROCEDURE — 3075F SYST BP GE 130 - 139MM HG: CPT | Performed by: NURSE PRACTITIONER

## 2023-10-24 RX ORDER — TRAZODONE HYDROCHLORIDE 100 MG/1
100 TABLET ORAL NIGHTLY
Qty: 90 TABLET | Refills: 1 | Status: SHIPPED | OUTPATIENT
Start: 2023-10-24

## 2023-10-24 RX ORDER — AZITHROMYCIN 250 MG/1
TABLET, FILM COATED ORAL
Qty: 6 TABLET | Refills: 0 | Status: SHIPPED | OUTPATIENT
Start: 2023-10-24

## 2023-10-24 RX ORDER — LEVOTHYROXINE SODIUM 112 UG/1
112 TABLET ORAL DAILY
Qty: 90 TABLET | Refills: 1 | Status: SHIPPED | OUTPATIENT
Start: 2023-10-24

## 2023-10-24 RX ORDER — BACLOFEN 10 MG/1
10 TABLET ORAL 3 TIMES DAILY
COMMUNITY

## 2023-10-24 NOTE — PROGRESS NOTES
"Chief Complaint  Med Refill, Hypothyroidism, and Sinus Problem (X1 month)    Subjective         Indra Herbert presents to [facility]  Presents to the office today for follow-up.  He states that he is out of his levothyroxine.  I did review recent lab work.  This was unremarkable.  Blood pressure is 130/84.  He denies any chest pain shortness breath palpitation at this time.  He does state that he has been having sinus congestion and drainage.  He also states he has had a productive cough recently.  He denies any fevers at this time.  He denies any nausea vomiting diarrhea.  Patient also states that he is struggling with insomnia.  He states the amitriptyline did not help.  He states that he would like something different to help him rest at night.    Hypothyroidism    Sinus Problem         Objective     Vitals:    10/24/23 1059   BP: 130/84   BP Location: Right arm   Patient Position: Sitting   Cuff Size: Small Adult   Pulse: 79   Temp: 97.3 °F (36.3 °C)   SpO2: 98%   Weight: 76.9 kg (169 lb 9.6 oz)   Height: 177.8 cm (70\")      Body mass index is 24.34 kg/m².    BMI is within normal parameters. No other follow-up for BMI required.        Physical Exam  Vitals reviewed.   Constitutional:       Appearance: Normal appearance.   HENT:      Nose: Congestion and rhinorrhea present.      Right Sinus: Maxillary sinus tenderness present.   Cardiovascular:      Rate and Rhythm: Normal rate and regular rhythm.      Pulses: Normal pulses.      Heart sounds: Normal heart sounds, S1 normal and S2 normal. No murmur heard.  Pulmonary:      Effort: Pulmonary effort is normal. No respiratory distress.      Breath sounds: Normal breath sounds.   Skin:     General: Skin is warm and dry.   Neurological:      Mental Status: He is alert and oriented to person, place, and time.   Psychiatric:         Attention and Perception: Attention normal.         Mood and Affect: Mood normal.         Behavior: " Behavior normal.          Result Review :   The following data was reviewed by: CARMELITA Garcia on 10/24/2023:      Procedures    Assessment and Plan   Diagnoses and all orders for this visit:    1. Insomnia, unspecified type (Primary)  -     traZODone (DESYREL) 100 MG tablet; Take 1 tablet by mouth Every Night.  Dispense: 90 tablet; Refill: 1    2. Hypothyroidism, unspecified type  -     levothyroxine (SYNTHROID, LEVOTHROID) 112 MCG tablet; Take 1 tablet by mouth Daily.  Dispense: 90 tablet; Refill: 1    3. Acute non-recurrent maxillary sinusitis  -     azithromycin (Zithromax Z-Chao) 250 MG tablet; Take 2 tablets by mouth on day 1, then 1 tablet daily on days 2-5  Dispense: 6 tablet; Refill: 0          Follow Up   Return in about 6 months (around 4/24/2024) for Recheck.  Patient was given instructions and counseling regarding his condition or for health maintenance advice. Please see specific information pulled into the AVS if appropriate.

## 2024-04-17 DIAGNOSIS — E03.9 HYPOTHYROIDISM, UNSPECIFIED TYPE: ICD-10-CM

## 2024-04-17 RX ORDER — LEVOTHYROXINE SODIUM 112 UG/1
112 TABLET ORAL DAILY
Qty: 90 TABLET | Refills: 0 | Status: SHIPPED | OUTPATIENT
Start: 2024-04-17

## 2024-04-24 ENCOUNTER — OFFICE VISIT (OUTPATIENT)
Dept: FAMILY MEDICINE CLINIC | Facility: CLINIC | Age: 54
End: 2024-04-24
Payer: MEDICAID

## 2024-04-24 VITALS
TEMPERATURE: 97.4 F | BODY MASS INDEX: 24.79 KG/M2 | SYSTOLIC BLOOD PRESSURE: 130 MMHG | HEIGHT: 70 IN | HEART RATE: 80 BPM | OXYGEN SATURATION: 98 % | WEIGHT: 173.2 LBS | DIASTOLIC BLOOD PRESSURE: 82 MMHG

## 2024-04-24 DIAGNOSIS — G47.00 INSOMNIA, UNSPECIFIED TYPE: ICD-10-CM

## 2024-04-24 DIAGNOSIS — E03.9 HYPOTHYROIDISM, UNSPECIFIED TYPE: Primary | ICD-10-CM

## 2024-04-24 DIAGNOSIS — Z13.220 SCREENING FOR LIPID DISORDERS: ICD-10-CM

## 2024-04-24 DIAGNOSIS — Z12.5 SCREENING FOR PROSTATE CANCER: ICD-10-CM

## 2024-04-24 PROCEDURE — 99213 OFFICE O/P EST LOW 20 MIN: CPT | Performed by: NURSE PRACTITIONER

## 2024-04-24 PROCEDURE — 1160F RVW MEDS BY RX/DR IN RCRD: CPT | Performed by: NURSE PRACTITIONER

## 2024-04-24 PROCEDURE — 3079F DIAST BP 80-89 MM HG: CPT | Performed by: NURSE PRACTITIONER

## 2024-04-24 PROCEDURE — 3075F SYST BP GE 130 - 139MM HG: CPT | Performed by: NURSE PRACTITIONER

## 2024-04-24 PROCEDURE — 1159F MED LIST DOCD IN RCRD: CPT | Performed by: NURSE PRACTITIONER

## 2024-04-24 RX ORDER — ZOLPIDEM TARTRATE 10 MG/1
10 TABLET ORAL NIGHTLY PRN
Qty: 30 TABLET | Refills: 0 | Status: SHIPPED | OUTPATIENT
Start: 2024-04-24

## 2024-04-24 NOTE — PROGRESS NOTES
"Chief Complaint  Hypothyroidism (6 month follow up)    Subjective         Indra Herbert presents to [facility]  Patient presents to the office today for 6-month follow-up.  I did explain that he is due for routine lab work.  I explained that we need to recheck his thyroid levels to ensure that his medication is efficacious.  Blood pressure is 130/80.  Nuys any chest pain shortness breath palpitation at this time.  He does state that the trazodone is not helping him with his sleep.  He states that he is not sleeping very well and this is affecting what he feels.  Patient does state that he does work third shift.  Discussed other medication regimens.  He states that he is willing to try anything.  I did discuss the risk and the benefits as well as possible side effects.    Hypothyroidism         Objective     Vitals:    04/24/24 1359   BP: 130/82   BP Location: Right arm   Patient Position: Sitting   Cuff Size: Adult   Pulse: 80   Temp: 97.4 °F (36.3 °C)   TempSrc: Temporal   SpO2: 98%   Weight: 78.6 kg (173 lb 3.2 oz)   Height: 177.8 cm (70\")      Body mass index is 24.85 kg/m².    BMI is within normal parameters. No other follow-up for BMI required.        Physical Exam  Vitals reviewed.   Constitutional:       Appearance: Normal appearance.   Cardiovascular:      Rate and Rhythm: Normal rate and regular rhythm.      Pulses: Normal pulses.      Heart sounds: Normal heart sounds, S1 normal and S2 normal. No murmur heard.  Pulmonary:      Effort: Pulmonary effort is normal. No respiratory distress.      Breath sounds: Normal breath sounds.   Skin:     General: Skin is warm and dry.   Neurological:      Mental Status: He is alert and oriented to person, place, and time.   Psychiatric:         Attention and Perception: Attention normal.         Mood and Affect: Mood normal.         Behavior: Behavior normal.          Result Review :   The following data was reviewed by: Omar" CARMELITA Grimaldo on 04/24/2024:      Procedures    Assessment and Plan   Diagnoses and all orders for this visit:    1. Hypothyroidism, unspecified type (Primary)  -     CBC (No Diff); Future  -     Comprehensive Metabolic Panel; Future  -     TSH; Future    2. Screening for prostate cancer  -     PSA Screen; Future    3. Screening for lipid disorders  -     Lipid Panel; Future    4. Insomnia, unspecified type  -     zolpidem (AMBIEN) 10 MG tablet; Take 1 tablet by mouth At Night As Needed for Sleep.  Dispense: 30 tablet; Refill: 0          Follow Up   Return in about 6 months (around 10/24/2024) for Recheck.  Patient was given instructions and counseling regarding his condition or for health maintenance advice. Please see specific information pulled into the AVS if appropriate.

## 2024-09-09 DIAGNOSIS — E03.9 HYPOTHYROIDISM, UNSPECIFIED TYPE: ICD-10-CM

## 2024-09-09 RX ORDER — LEVOTHYROXINE SODIUM 112 UG/1
112 TABLET ORAL DAILY
Qty: 90 TABLET | Refills: 0 | Status: SHIPPED | OUTPATIENT
Start: 2024-09-09

## 2024-10-25 ENCOUNTER — OFFICE VISIT (OUTPATIENT)
Dept: FAMILY MEDICINE CLINIC | Facility: CLINIC | Age: 54
End: 2024-10-25
Payer: MEDICAID

## 2024-10-25 VITALS
HEIGHT: 70 IN | OXYGEN SATURATION: 97 % | WEIGHT: 180 LBS | TEMPERATURE: 98.6 F | DIASTOLIC BLOOD PRESSURE: 68 MMHG | HEART RATE: 83 BPM | SYSTOLIC BLOOD PRESSURE: 120 MMHG | RESPIRATION RATE: 20 BRPM | BODY MASS INDEX: 25.77 KG/M2

## 2024-10-25 DIAGNOSIS — E03.9 HYPOTHYROIDISM, UNSPECIFIED TYPE: ICD-10-CM

## 2024-10-25 DIAGNOSIS — M79.642 HAND PAIN, LEFT: Primary | ICD-10-CM

## 2024-10-25 DIAGNOSIS — M25.542 ARTHRALGIA OF LEFT HAND: ICD-10-CM

## 2024-10-25 DIAGNOSIS — G47.00 INSOMNIA, UNSPECIFIED TYPE: ICD-10-CM

## 2024-10-25 PROCEDURE — 1159F MED LIST DOCD IN RCRD: CPT | Performed by: NURSE PRACTITIONER

## 2024-10-25 PROCEDURE — 99214 OFFICE O/P EST MOD 30 MIN: CPT | Performed by: NURSE PRACTITIONER

## 2024-10-25 PROCEDURE — 3074F SYST BP LT 130 MM HG: CPT | Performed by: NURSE PRACTITIONER

## 2024-10-25 PROCEDURE — 1160F RVW MEDS BY RX/DR IN RCRD: CPT | Performed by: NURSE PRACTITIONER

## 2024-10-25 PROCEDURE — 3078F DIAST BP <80 MM HG: CPT | Performed by: NURSE PRACTITIONER

## 2024-10-25 RX ORDER — QUETIAPINE FUMARATE 100 MG/1
100 TABLET, FILM COATED ORAL NIGHTLY
Qty: 90 TABLET | Refills: 1 | Status: SHIPPED | OUTPATIENT
Start: 2024-10-25

## 2024-10-25 RX ORDER — MELOXICAM 15 MG/1
1 TABLET ORAL DAILY
COMMUNITY
Start: 2024-10-08

## 2024-10-25 RX ORDER — LEVOTHYROXINE SODIUM 112 UG/1
112 TABLET ORAL DAILY
Qty: 90 TABLET | Refills: 1 | Status: SHIPPED | OUTPATIENT
Start: 2024-10-25

## 2024-10-25 NOTE — PROGRESS NOTES
"Chief Complaint  Hypothyroidism, Follow-up (6 months follow up ), and Med Refill    Subjective         Indra Herbert presents to [facility]  [facility] to the office for 6-month follow-up.  I explained to the patient he is due for routine lab work..  Patient states he will get this completed at his earliest convenience.  He does complain of left hand pain and swelling.  He denies any injury or trauma.  Patient states that he has not punched anything or hit his hand.  He states that he woke up 1 morning with the third finger/knuckle very tender and swollen.  He states that he can make a fist although it is very tender.  Denies any erythema.  Patient states that the Ambien did not help him rest.  He states that it worsened his insomnia.  He has tried trazodone as well as some amitriptyline in the past.  I did explain that we would try Seroquel to see if this improves his sleep patterns.    Hypothyroidism    Follow-up  Hyperlipidemia: Exacerbating diseases: hypothyroidism.        Objective     Vitals:    10/25/24 0917   BP: 120/68   Pulse: 83   Resp: 20   Temp: 98.6 °F (37 °C)   SpO2: 97%   Weight: 81.6 kg (180 lb)   Height: 177.8 cm (70\")      Body mass index is 25.83 kg/m².             Physical Exam  Vitals reviewed.   Constitutional:       Appearance: Normal appearance.   Cardiovascular:      Rate and Rhythm: Normal rate and regular rhythm.      Pulses: Normal pulses.      Heart sounds: Normal heart sounds, S1 normal and S2 normal. No murmur heard.  Pulmonary:      Effort: Pulmonary effort is normal. No respiratory distress.      Breath sounds: Normal breath sounds.   Musculoskeletal:        Hands:       Comments: Pain with palpation. No erythema noted.  Mild edema noted FROM   Skin:     General: Skin is warm and dry.   Neurological:      Mental Status: He is alert and oriented to person, place, and time.   Psychiatric:         Attention and Perception: Attention normal.         " Mood and Affect: Mood normal.         Behavior: Behavior normal.          Result Review :   The following data was reviewed by: CARMELITA Garcia on 10/25/2024:      Procedures    Assessment and Plan   Diagnoses and all orders for this visit:    1. Hand pain, left (Primary)  -     XR Hand 3+ View Left; Future    2. Hypothyroidism, unspecified type  -     levothyroxine (SYNTHROID, LEVOTHROID) 112 MCG tablet; Take 1 tablet by mouth Daily.  Dispense: 90 tablet; Refill: 1  -     TSH+Free T4; Future    3. Insomnia, unspecified type  -     QUEtiapine (SEROquel) 100 MG tablet; Take 1 tablet by mouth Every Night.  Dispense: 90 tablet; Refill: 1    4. Arthralgia of left hand  -     Uric acid; Future          Follow Up   Return in about 6 months (around 4/25/2025) for Recheck.  Patient was given instructions and counseling regarding his condition or for health maintenance advice. Please see specific information pulled into the AVS if appropriate.

## 2024-10-28 ENCOUNTER — TELEPHONE (OUTPATIENT)
Dept: FAMILY MEDICINE CLINIC | Facility: CLINIC | Age: 54
End: 2024-10-28
Payer: MEDICAID

## 2024-11-18 ENCOUNTER — LAB (OUTPATIENT)
Dept: LAB | Facility: HOSPITAL | Age: 54
End: 2024-11-18
Payer: MEDICAID

## 2024-11-18 ENCOUNTER — HOSPITAL ENCOUNTER (OUTPATIENT)
Dept: GENERAL RADIOLOGY | Facility: HOSPITAL | Age: 54
Discharge: HOME | End: 2024-11-18
Payer: MEDICAID

## 2024-11-18 DIAGNOSIS — M25.542 ARTHRALGIA OF LEFT HAND: ICD-10-CM

## 2024-11-18 DIAGNOSIS — Z13.220 SCREENING FOR LIPID DISORDERS: ICD-10-CM

## 2024-11-18 DIAGNOSIS — M79.642 HAND PAIN, LEFT: ICD-10-CM

## 2024-11-18 DIAGNOSIS — E03.9 HYPOTHYROIDISM, UNSPECIFIED TYPE: ICD-10-CM

## 2024-11-18 DIAGNOSIS — Z12.5 SCREENING FOR PROSTATE CANCER: ICD-10-CM

## 2024-11-18 LAB
ALBUMIN SERPL-MCNC: 4.1 G/DL (ref 3.5–5.2)
ALBUMIN/GLOB SERPL: 1.2 G/DL
ALP SERPL-CCNC: 61 U/L (ref 39–117)
ALT SERPL W P-5'-P-CCNC: 10 U/L (ref 1–41)
ANION GAP SERPL CALCULATED.3IONS-SCNC: 8.4 MMOL/L (ref 5–15)
AST SERPL-CCNC: 13 U/L (ref 1–40)
BILIRUB SERPL-MCNC: 0.3 MG/DL (ref 0–1.2)
BUN SERPL-MCNC: 15 MG/DL (ref 6–20)
BUN/CREAT SERPL: 10.8 (ref 7–25)
CALCIUM SPEC-SCNC: 9.3 MG/DL (ref 8.6–10.5)
CHLORIDE SERPL-SCNC: 103 MMOL/L (ref 98–107)
CHOLEST SERPL-MCNC: 186 MG/DL (ref 0–200)
CO2 SERPL-SCNC: 26.6 MMOL/L (ref 22–29)
CREAT SERPL-MCNC: 1.39 MG/DL (ref 0.76–1.27)
DEPRECATED RDW RBC AUTO: 42.5 FL (ref 37–54)
EGFRCR SERPLBLD CKD-EPI 2021: 61 ML/MIN/1.73
ERYTHROCYTE [DISTWIDTH] IN BLOOD BY AUTOMATED COUNT: 12 % (ref 12.3–15.4)
GLOBULIN UR ELPH-MCNC: 3.4 GM/DL
GLUCOSE SERPL-MCNC: 106 MG/DL (ref 65–99)
HCT VFR BLD AUTO: 50 % (ref 37.5–51)
HDLC SERPL-MCNC: 47 MG/DL (ref 40–60)
HGB BLD-MCNC: 17 G/DL (ref 13–17.7)
LDLC SERPL CALC-MCNC: 125 MG/DL (ref 0–100)
LDLC/HDLC SERPL: 2.64 {RATIO}
MCH RBC QN AUTO: 32.8 PG (ref 26.6–33)
MCHC RBC AUTO-ENTMCNC: 34 G/DL (ref 31.5–35.7)
MCV RBC AUTO: 96.3 FL (ref 79–97)
PLATELET # BLD AUTO: 197 10*3/MM3 (ref 140–450)
PMV BLD AUTO: 11.6 FL (ref 6–12)
POTASSIUM SERPL-SCNC: 4.5 MMOL/L (ref 3.5–5.2)
PROT SERPL-MCNC: 7.5 G/DL (ref 6–8.5)
PSA SERPL-MCNC: 0.34 NG/ML (ref 0–4)
RBC # BLD AUTO: 5.19 10*6/MM3 (ref 4.14–5.8)
SODIUM SERPL-SCNC: 138 MMOL/L (ref 136–145)
T4 FREE SERPL-MCNC: 1.5 NG/DL (ref 0.92–1.68)
TRIGL SERPL-MCNC: 75 MG/DL (ref 0–150)
TSH SERPL DL<=0.05 MIU/L-ACNC: 1.2 UIU/ML (ref 0.27–4.2)
URATE SERPL-MCNC: 5.4 MG/DL (ref 3.4–7)
VLDLC SERPL-MCNC: 14 MG/DL (ref 5–40)
WBC NRBC COR # BLD AUTO: 9.5 10*3/MM3 (ref 3.4–10.8)

## 2024-11-18 PROCEDURE — 80053 COMPREHEN METABOLIC PANEL: CPT

## 2024-11-18 PROCEDURE — 85027 COMPLETE CBC AUTOMATED: CPT

## 2024-11-18 PROCEDURE — 36415 COLL VENOUS BLD VENIPUNCTURE: CPT

## 2024-11-18 PROCEDURE — 84550 ASSAY OF BLOOD/URIC ACID: CPT

## 2024-11-18 PROCEDURE — G0103 PSA SCREENING: HCPCS

## 2024-11-18 PROCEDURE — 80061 LIPID PANEL: CPT

## 2024-11-18 PROCEDURE — 84443 ASSAY THYROID STIM HORMONE: CPT

## 2024-11-18 PROCEDURE — 84439 ASSAY OF FREE THYROXINE: CPT

## 2024-11-18 PROCEDURE — 73130 X-RAY EXAM OF HAND: CPT

## 2024-11-20 RX ORDER — METHYLPREDNISOLONE 4 MG/1
TABLET ORAL
Qty: 21 TABLET | Refills: 0 | Status: SHIPPED | OUTPATIENT
Start: 2024-11-20

## 2025-04-25 ENCOUNTER — OFFICE VISIT (OUTPATIENT)
Dept: FAMILY MEDICINE CLINIC | Facility: CLINIC | Age: 55
End: 2025-04-25
Payer: MEDICAID

## 2025-04-25 VITALS
BODY MASS INDEX: 25.88 KG/M2 | OXYGEN SATURATION: 95 % | HEIGHT: 70 IN | WEIGHT: 180.8 LBS | TEMPERATURE: 96.4 F | HEART RATE: 91 BPM | SYSTOLIC BLOOD PRESSURE: 136 MMHG | DIASTOLIC BLOOD PRESSURE: 84 MMHG

## 2025-04-25 DIAGNOSIS — J30.9 ALLERGIC RHINITIS, UNSPECIFIED SEASONALITY, UNSPECIFIED TRIGGER: ICD-10-CM

## 2025-04-25 DIAGNOSIS — R09.82 PND (POST-NASAL DRIP): ICD-10-CM

## 2025-04-25 DIAGNOSIS — R11.0 NAUSEA: ICD-10-CM

## 2025-04-25 DIAGNOSIS — R53.83 FATIGUE, UNSPECIFIED TYPE: Primary | ICD-10-CM

## 2025-04-25 DIAGNOSIS — E03.9 HYPOTHYROIDISM, UNSPECIFIED TYPE: ICD-10-CM

## 2025-04-25 DIAGNOSIS — Z13.6 SCREENING FOR CARDIOVASCULAR CONDITION: ICD-10-CM

## 2025-04-25 RX ORDER — CHLORCYCLIZINE HYDROCHLORIDE AND PSEUDOEPHEDRINE HYDROCHLORIDE 25; 60 MG/1; MG/1
1 TABLET ORAL EVERY 8 HOURS PRN
Qty: 42 TABLET | Refills: 0 | Status: SHIPPED | OUTPATIENT
Start: 2025-04-25

## 2025-04-25 RX ORDER — CYANOCOBALAMIN 1000 UG/ML
1000 INJECTION, SOLUTION INTRAMUSCULAR; SUBCUTANEOUS
Status: DISCONTINUED | OUTPATIENT
Start: 2025-04-25 | End: 2025-04-25

## 2025-04-25 RX ORDER — ONDANSETRON 4 MG/1
4 TABLET, ORALLY DISINTEGRATING ORAL EVERY 8 HOURS PRN
Qty: 30 TABLET | Refills: 0 | Status: SHIPPED | OUTPATIENT
Start: 2025-04-25

## 2025-04-25 RX ORDER — CYANOCOBALAMIN 1000 UG/ML
1000 INJECTION, SOLUTION INTRAMUSCULAR; SUBCUTANEOUS ONCE
Status: COMPLETED | OUTPATIENT
Start: 2025-04-25 | End: 2025-04-25

## 2025-04-25 RX ADMIN — CYANOCOBALAMIN 1000 MCG: 1000 INJECTION, SOLUTION INTRAMUSCULAR; SUBCUTANEOUS at 10:56

## 2025-04-25 NOTE — PROGRESS NOTES
"Chief Complaint  Hypothyroidism (6 month follow up)    Subjective         Indra Herbert presents to [facility]  Patient presents to the office for 6-month follow-up for his hypothyroidism.  I explained the patient he is due for routine lab work.  He states that he will get this done at his earliest convenience.  Does state that he has been having a lot of congestion and waking up nauseated.  I did explain that the postnasal drip may be causing his nausea.  I did discuss putting him on Stahist to see if this improves his postnasal drip and providing some Zofran for the nausea.  Blood pressure is 136/84.  He denies any chest pain shortness breath palpitations this time.  I did discuss low-dose CT screening.  He states that he does not want to have this completed.  Patient also states that he does not want to have any colon cancer screenings done as well.  Patient does state that he has been more fatigued than usual.  I explained that we would see if this has to do with his hypothyroidism.  Also discussed giving him an injection of B12 to see if this improves his energy levels.    Hypothyroidism         Objective     Vitals:    04/25/25 0932   BP: 136/84   BP Location: Right arm   Patient Position: Sitting   Cuff Size: Adult   Pulse: 91   Temp: 96.4 °F (35.8 °C)   TempSrc: Temporal   SpO2: 95%   Weight: 82 kg (180 lb 12.8 oz)   Height: 177.8 cm (70\")      Body mass index is 25.94 kg/m².             Physical Exam  Vitals reviewed.   Constitutional:       Appearance: Normal appearance.   Cardiovascular:      Rate and Rhythm: Normal rate and regular rhythm.      Pulses: Normal pulses.      Heart sounds: Normal heart sounds, S1 normal and S2 normal. No murmur heard.  Pulmonary:      Effort: Pulmonary effort is normal. No respiratory distress.      Breath sounds: Normal breath sounds.   Skin:     General: Skin is warm and dry.   Neurological:      Mental Status: He is alert and " oriented to person, place, and time.   Psychiatric:         Attention and Perception: Attention normal.         Mood and Affect: Mood normal.         Behavior: Behavior normal.          Result Review :   The following data was reviewed by: CARMELITA Garcia on 04/25/2025:      Procedures    Assessment and Plan   Diagnoses and all orders for this visit:    1. Fatigue, unspecified type (Primary)  -     cyanocobalamin injection 1,000 mcg  -     Discontinue: cyanocobalamin injection 1,000 mcg  -     CBC (No Diff); Future  -     Comprehensive Metabolic Panel; Future    2. Hypothyroidism, unspecified type  -     TSH; Future  -     T4, Free; Future    3. Screening for cardiovascular condition  -     Lipid Panel; Future    4. Nausea  -     ondansetron ODT (ZOFRAN-ODT) 4 MG disintegrating tablet; Place 1 tablet on the tongue Every 8 (Eight) Hours As Needed for Nausea or Vomiting.  Dispense: 30 tablet; Refill: 0    5. PND (post-nasal drip)    6. Allergic rhinitis, unspecified seasonality, unspecified trigger  -     Chlorcyclizine-Pseudoephed (Stahist AD) 25-60 MG tablet; Take 1 tablet by mouth Every 8 (Eight) Hours As Needed (congestion).  Dispense: 42 tablet; Refill: 0          Follow Up   Return in about 6 months (around 10/25/2025), or if symptoms worsen or fail to improve.  Patient was given instructions and counseling regarding his condition or for health maintenance advice. Please see specific information pulled into the AVS if appropriate.

## 2025-05-07 ENCOUNTER — TELEPHONE (OUTPATIENT)
Dept: FAMILY MEDICINE CLINIC | Facility: CLINIC | Age: 55
End: 2025-05-07
Payer: MEDICAID

## 2025-05-07 NOTE — TELEPHONE ENCOUNTER
Patient states the stahist is not covered by insurance and cost $59.99. patient wanted to let PCP know he would not be picking up this medication due to price

## 2025-05-21 ENCOUNTER — HOSPITAL ENCOUNTER (EMERGENCY)
Facility: HOSPITAL | Age: 55
Discharge: LEFT BEFORE DISCHARGE | End: 2025-05-21
Attending: EMERGENCY MEDICINE
Payer: COMMERCIAL

## 2025-05-21 ENCOUNTER — TELEPHONE (OUTPATIENT)
Dept: FAMILY MEDICINE CLINIC | Facility: CLINIC | Age: 55
End: 2025-05-21
Payer: COMMERCIAL

## 2025-05-21 ENCOUNTER — APPOINTMENT (OUTPATIENT)
Dept: GENERAL RADIOLOGY | Facility: HOSPITAL | Age: 55
End: 2025-05-21
Payer: COMMERCIAL

## 2025-05-21 VITALS
SYSTOLIC BLOOD PRESSURE: 150 MMHG | OXYGEN SATURATION: 99 % | BODY MASS INDEX: 25.94 KG/M2 | RESPIRATION RATE: 18 BRPM | TEMPERATURE: 97.7 F | HEIGHT: 70 IN | DIASTOLIC BLOOD PRESSURE: 87 MMHG | HEART RATE: 71 BPM

## 2025-05-21 PROCEDURE — 99211 OFF/OP EST MAY X REQ PHY/QHP: CPT | Performed by: EMERGENCY MEDICINE
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
## 2025-06-03 RX ORDER — PSEUDOEPHEDRINE HCL 120 MG/1
120 TABLET, FILM COATED, EXTENDED RELEASE ORAL EVERY 12 HOURS
Qty: 30 TABLET | Refills: 0 | Status: SHIPPED | OUTPATIENT
Start: 2025-06-03

## 2025-06-03 RX ORDER — MONTELUKAST SODIUM 10 MG/1
10 TABLET ORAL NIGHTLY
Qty: 90 TABLET | Refills: 0 | Status: SHIPPED | OUTPATIENT
Start: 2025-06-03

## 2025-06-14 DIAGNOSIS — E03.9 HYPOTHYROIDISM, UNSPECIFIED TYPE: ICD-10-CM

## 2025-06-16 RX ORDER — LEVOTHYROXINE SODIUM 112 UG/1
112 TABLET ORAL DAILY
Qty: 90 TABLET | Refills: 1 | Status: SHIPPED | OUTPATIENT
Start: 2025-06-16

## 2025-06-17 ENCOUNTER — LAB (OUTPATIENT)
Dept: LAB | Facility: HOSPITAL | Age: 55
End: 2025-06-17
Payer: COMMERCIAL

## 2025-06-17 DIAGNOSIS — E03.9 HYPOTHYROIDISM, UNSPECIFIED TYPE: ICD-10-CM

## 2025-06-17 DIAGNOSIS — R53.83 FATIGUE, UNSPECIFIED TYPE: ICD-10-CM

## 2025-06-17 DIAGNOSIS — Z13.6 SCREENING FOR CARDIOVASCULAR CONDITION: ICD-10-CM

## 2025-06-17 LAB
ALBUMIN SERPL-MCNC: 4.4 G/DL (ref 3.5–5.2)
ALBUMIN/GLOB SERPL: 1.4 G/DL
ALP SERPL-CCNC: 61 U/L (ref 39–117)
ALT SERPL W P-5'-P-CCNC: 8 U/L (ref 1–41)
ANION GAP SERPL CALCULATED.3IONS-SCNC: 11.3 MMOL/L (ref 5–15)
AST SERPL-CCNC: 16 U/L (ref 1–40)
BILIRUB SERPL-MCNC: 0.4 MG/DL (ref 0–1.2)
BUN SERPL-MCNC: 13 MG/DL (ref 6–20)
BUN/CREAT SERPL: 8.2 (ref 7–25)
CALCIUM SPEC-SCNC: 9.3 MG/DL (ref 8.6–10.5)
CHLORIDE SERPL-SCNC: 104 MMOL/L (ref 98–107)
CHOLEST SERPL-MCNC: 165 MG/DL (ref 0–200)
CO2 SERPL-SCNC: 23.7 MMOL/L (ref 22–29)
CREAT SERPL-MCNC: 1.58 MG/DL (ref 0.76–1.27)
DEPRECATED RDW RBC AUTO: 45.1 FL (ref 37–54)
EGFRCR SERPLBLD CKD-EPI 2021: 52.3 ML/MIN/1.73
ERYTHROCYTE [DISTWIDTH] IN BLOOD BY AUTOMATED COUNT: 12.6 % (ref 12.3–15.4)
GLOBULIN UR ELPH-MCNC: 3.1 GM/DL
GLUCOSE SERPL-MCNC: 114 MG/DL (ref 65–99)
HCT VFR BLD AUTO: 48 % (ref 37.5–51)
HDLC SERPL-MCNC: 40 MG/DL (ref 40–60)
HGB BLD-MCNC: 16.3 G/DL (ref 13–17.7)
LDLC SERPL CALC-MCNC: 108 MG/DL (ref 0–100)
LDLC/HDLC SERPL: 2.69 {RATIO}
MCH RBC QN AUTO: 32.8 PG (ref 26.6–33)
MCHC RBC AUTO-ENTMCNC: 34 G/DL (ref 31.5–35.7)
MCV RBC AUTO: 96.6 FL (ref 79–97)
PLATELET # BLD AUTO: 160 10*3/MM3 (ref 140–450)
PMV BLD AUTO: 11.7 FL (ref 6–12)
POTASSIUM SERPL-SCNC: 4.6 MMOL/L (ref 3.5–5.2)
PROT SERPL-MCNC: 7.5 G/DL (ref 6–8.5)
RBC # BLD AUTO: 4.97 10*6/MM3 (ref 4.14–5.8)
SODIUM SERPL-SCNC: 139 MMOL/L (ref 136–145)
T4 FREE SERPL-MCNC: 1.53 NG/DL (ref 0.92–1.68)
TRIGL SERPL-MCNC: 88 MG/DL (ref 0–150)
TSH SERPL DL<=0.05 MIU/L-ACNC: 0.31 UIU/ML (ref 0.27–4.2)
VLDLC SERPL-MCNC: 17 MG/DL (ref 5–40)
WBC NRBC COR # BLD AUTO: 7.51 10*3/MM3 (ref 3.4–10.8)

## 2025-06-17 PROCEDURE — 84443 ASSAY THYROID STIM HORMONE: CPT

## 2025-06-17 PROCEDURE — 80061 LIPID PANEL: CPT

## 2025-06-17 PROCEDURE — 84439 ASSAY OF FREE THYROXINE: CPT

## 2025-06-17 PROCEDURE — 80053 COMPREHEN METABOLIC PANEL: CPT

## 2025-06-17 PROCEDURE — 85027 COMPLETE CBC AUTOMATED: CPT

## 2025-06-17 PROCEDURE — 36415 COLL VENOUS BLD VENIPUNCTURE: CPT

## 2025-06-23 ENCOUNTER — OFFICE VISIT (OUTPATIENT)
Dept: FAMILY MEDICINE CLINIC | Facility: CLINIC | Age: 55
End: 2025-06-23
Payer: COMMERCIAL

## 2025-06-23 VITALS
WEIGHT: 178.6 LBS | HEART RATE: 69 BPM | TEMPERATURE: 97.8 F | OXYGEN SATURATION: 96 % | SYSTOLIC BLOOD PRESSURE: 142 MMHG | HEIGHT: 70 IN | DIASTOLIC BLOOD PRESSURE: 82 MMHG | BODY MASS INDEX: 25.57 KG/M2
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
## 2025-06-23 DIAGNOSIS — R20.2 PARESTHESIA OF LEFT ARM: ICD-10-CM

## 2025-06-23 DIAGNOSIS — H53.9 VISION CHANGES: ICD-10-CM

## 2025-06-23 DIAGNOSIS — R42 DIZZINESS: ICD-10-CM

## 2025-06-23 DIAGNOSIS — M54.2 CERVICAL PAIN (NECK): ICD-10-CM

## 2025-06-23 PROCEDURE — 99213 OFFICE O/P EST LOW 20 MIN: CPT | Performed by: NURSE PRACTITIONER
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
## 2025-07-23 ENCOUNTER — OFFICE VISIT (OUTPATIENT)
Dept: FAMILY MEDICINE CLINIC | Facility: CLINIC | Age: 55
End: 2025-07-23
Payer: COMMERCIAL

## 2025-07-23 VITALS
HEIGHT: 70 IN | OXYGEN SATURATION: 97 % | BODY MASS INDEX: 26.17 KG/M2 | DIASTOLIC BLOOD PRESSURE: 84 MMHG | TEMPERATURE: 97.1 F | HEART RATE: 72 BPM | SYSTOLIC BLOOD PRESSURE: 138 MMHG | WEIGHT: 182.8 LBS

## 2025-07-23 DIAGNOSIS — N28.9 RENAL INSUFFICIENCY: ICD-10-CM

## 2025-07-23 DIAGNOSIS — I10 PRIMARY HYPERTENSION: Primary | ICD-10-CM

## 2025-07-23 PROCEDURE — 1160F RVW MEDS BY RX/DR IN RCRD: CPT | Performed by: NURSE PRACTITIONER

## 2025-07-23 PROCEDURE — 1159F MED LIST DOCD IN RCRD: CPT | Performed by: NURSE PRACTITIONER

## 2025-07-23 PROCEDURE — 3075F SYST BP GE 130 - 139MM HG: CPT | Performed by: NURSE PRACTITIONER

## 2025-07-23 PROCEDURE — 99213 OFFICE O/P EST LOW 20 MIN: CPT | Performed by: NURSE PRACTITIONER

## 2025-07-23 PROCEDURE — 3079F DIAST BP 80-89 MM HG: CPT | Performed by: NURSE PRACTITIONER

## 2025-07-23 RX ORDER — CARVEDILOL 3.12 MG/1
3.12 TABLET ORAL 2 TIMES DAILY WITH MEALS
Qty: 180 TABLET | Refills: 0 | Status: SHIPPED | OUTPATIENT
Start: 2025-07-23

## 2025-07-23 NOTE — PROGRESS NOTES
"Chief Complaint  Hypothyroidism (Follow up)    Subjective         Indra Herbert presents to [facility]  Patient presents to the office for a follow-up.  I did review recent lab work.  Patient's kidney function did decrease significantly.  I did ask him to avoid any anti-inflammatories such as naproxen or ibuprofen.  Patient is no longer taking Mobic.  Splane that we would recheck these levels in 3 months.  Patient did have imaging completed previously which showed cardiomegaly.  I did discuss making sure that we control his blood pressure.  I did discuss starting him on carvedilol and to reevaluate him in 3 months.  I also explained that we would recheck his kidney function to ensure that it returns to normal.    Hypothyroidism         Objective     Vitals:    07/23/25 0936   BP: 138/84   BP Location: Right arm   Patient Position: Sitting   Cuff Size: Adult   Pulse: 72   Temp: 97.1 °F (36.2 °C)   TempSrc: Temporal   SpO2: 97%   Weight: 82.9 kg (182 lb 12.8 oz)   Height: 177.8 cm (70\")      Body mass index is 26.23 kg/m².             Physical Exam  Vitals reviewed.   Constitutional:       Appearance: Normal appearance.   Cardiovascular:      Rate and Rhythm: Normal rate and regular rhythm.      Pulses: Normal pulses.      Heart sounds: Normal heart sounds, S1 normal and S2 normal. No murmur heard.  Pulmonary:      Effort: Pulmonary effort is normal. No respiratory distress.      Breath sounds: Normal breath sounds.   Skin:     General: Skin is warm and dry.   Neurological:      Mental Status: He is alert and oriented to person, place, and time.   Psychiatric:         Attention and Perception: Attention normal.         Mood and Affect: Mood normal.         Behavior: Behavior normal.          Result Review :   The following data was reviewed by: CARMELITA Garcia on 07/23/2025:      Procedures    Assessment and Plan   Diagnoses and all orders for this visit:    1. Primary " hypertension (Primary)  -     carvedilol (Coreg) 3.125 MG tablet; Take 1 tablet by mouth 2 (Two) Times a Day With Meals.  Dispense: 180 tablet; Refill: 0    2. Renal insufficiency  -     Basic metabolic panel; Future          Follow Up   Return in about 3 months (around 10/23/2025) for Recheck.  Patient was given instructions and counseling regarding his condition or for health maintenance advice. Please see specific information pulled into the AVS if appropriate.

## 2025-08-04 ENCOUNTER — TELEPHONE (OUTPATIENT)
Dept: FAMILY MEDICINE CLINIC | Facility: CLINIC | Age: 55
End: 2025-08-04
Payer: COMMERCIAL

## 2025-08-04 DIAGNOSIS — R20.2 PARESTHESIA OF LEFT ARM: ICD-10-CM

## 2025-08-04 DIAGNOSIS — M54.2 CERVICAL PAIN (NECK): Primary | ICD-10-CM
